# Patient Record
Sex: MALE | Race: WHITE | URBAN - METROPOLITAN AREA
[De-identification: names, ages, dates, MRNs, and addresses within clinical notes are randomized per-mention and may not be internally consistent; named-entity substitution may affect disease eponyms.]

---

## 2017-10-06 ENCOUNTER — APPOINTMENT (RX ONLY)
Dept: URBAN - METROPOLITAN AREA CLINIC 23 | Facility: CLINIC | Age: 82
Setting detail: DERMATOLOGY
End: 2017-10-06

## 2017-10-06 DIAGNOSIS — L72.0 EPIDERMAL CYST: ICD-10-CM

## 2017-10-06 DIAGNOSIS — L82.1 OTHER SEBORRHEIC KERATOSIS: ICD-10-CM

## 2017-10-06 DIAGNOSIS — L81.4 OTHER MELANIN HYPERPIGMENTATION: ICD-10-CM

## 2017-10-06 DIAGNOSIS — L21.8 OTHER SEBORRHEIC DERMATITIS: ICD-10-CM

## 2017-10-06 DIAGNOSIS — D18.0 HEMANGIOMA: ICD-10-CM

## 2017-10-06 DIAGNOSIS — L57.0 ACTINIC KERATOSIS: ICD-10-CM

## 2017-10-06 PROBLEM — I10 ESSENTIAL (PRIMARY) HYPERTENSION: Status: ACTIVE | Noted: 2017-10-06

## 2017-10-06 PROBLEM — D18.01 HEMANGIOMA OF SKIN AND SUBCUTANEOUS TISSUE: Status: ACTIVE | Noted: 2017-10-06

## 2017-10-06 PROBLEM — M12.9 ARTHROPATHY, UNSPECIFIED: Status: ACTIVE | Noted: 2017-10-06

## 2017-10-06 PROCEDURE — ? COUNSELING

## 2017-10-06 PROCEDURE — 17000 DESTRUCT PREMALG LESION: CPT

## 2017-10-06 PROCEDURE — 99202 OFFICE O/P NEW SF 15 MIN: CPT | Mod: 25

## 2017-10-06 PROCEDURE — ? LIQUID NITROGEN

## 2017-10-06 PROCEDURE — ? PRESCRIPTION

## 2017-10-06 PROCEDURE — 17003 DESTRUCT PREMALG LES 2-14: CPT

## 2017-10-06 RX ORDER — TRIAMCINOLONE ACETONIDE 1 MG/ML
LOTION TOPICAL
Qty: 1 | Refills: 2 | Status: ERX | COMMUNITY
Start: 2017-10-06

## 2017-10-06 RX ADMIN — TRIAMCINOLONE ACETONIDE: 1 LOTION TOPICAL at 13:06

## 2017-10-06 ASSESSMENT — LOCATION ZONE DERM
LOCATION ZONE: ARM
LOCATION ZONE: FACE
LOCATION ZONE: HAND
LOCATION ZONE: SCALP
LOCATION ZONE: EAR
LOCATION ZONE: NOSE
LOCATION ZONE: TRUNK

## 2017-10-06 ASSESSMENT — LOCATION SIMPLE DESCRIPTION DERM
LOCATION SIMPLE: POSTERIOR SCALP
LOCATION SIMPLE: RIGHT SHOULDER
LOCATION SIMPLE: RIGHT HAND
LOCATION SIMPLE: RIGHT UPPER BACK
LOCATION SIMPLE: LEFT FOREARM
LOCATION SIMPLE: SCALP
LOCATION SIMPLE: RIGHT CHEEK
LOCATION SIMPLE: RIGHT EYEBROW
LOCATION SIMPLE: LEFT SHOULDER
LOCATION SIMPLE: RIGHT FOREHEAD
LOCATION SIMPLE: ABDOMEN
LOCATION SIMPLE: LEFT EAR
LOCATION SIMPLE: LEFT UPPER BACK
LOCATION SIMPLE: NOSE
LOCATION SIMPLE: CHEST

## 2017-10-06 ASSESSMENT — LOCATION DETAILED DESCRIPTION DERM
LOCATION DETAILED: LEFT MID-UPPER BACK
LOCATION DETAILED: RIGHT MEDIAL MALAR CHEEK
LOCATION DETAILED: LEFT POSTERIOR SHOULDER
LOCATION DETAILED: STERNUM
LOCATION DETAILED: RIGHT MEDIAL UPPER BACK
LOCATION DETAILED: LEFT SUPERIOR PARIETAL SCALP
LOCATION DETAILED: RIGHT INFERIOR POSTAURICULAR SKIN
LOCATION DETAILED: POSTERIOR MID-PARIETAL SCALP
LOCATION DETAILED: LEFT DISTAL DORSAL FOREARM
LOCATION DETAILED: EPIGASTRIC SKIN
LOCATION DETAILED: LEFT INFERIOR POSTAURICULAR SKIN
LOCATION DETAILED: NASAL DORSUM
LOCATION DETAILED: RIGHT LATERAL EYEBROW
LOCATION DETAILED: RIGHT POSTERIOR SHOULDER
LOCATION DETAILED: RIGHT RADIAL DORSAL HAND
LOCATION DETAILED: RIGHT SUPERIOR FOREHEAD
LOCATION DETAILED: LEFT SUPERIOR HELIX

## 2022-03-19 PROBLEM — I25.119 ATHEROSCLEROTIC HEART DISEASE OF NATIVE CORONARY ARTERY WITH UNSPECIFIED ANGINA PECTORIS (HCC): Status: ACTIVE | Noted: 2021-12-10

## 2022-03-20 PROBLEM — I20.9 ANGINA PECTORIS, UNSPECIFIED (HCC): Status: ACTIVE | Noted: 2021-12-10

## 2022-06-21 ENCOUNTER — OFFICE VISIT (OUTPATIENT)
Dept: FAMILY MEDICINE CLINIC | Facility: CLINIC | Age: 87
End: 2022-06-21
Payer: MEDICARE

## 2022-06-21 VITALS
OXYGEN SATURATION: 97 % | HEART RATE: 47 BPM | SYSTOLIC BLOOD PRESSURE: 92 MMHG | BODY MASS INDEX: 31.9 KG/M2 | WEIGHT: 216 LBS | DIASTOLIC BLOOD PRESSURE: 60 MMHG | TEMPERATURE: 97.5 F

## 2022-06-21 DIAGNOSIS — E53.8 B12 DEFICIENCY: ICD-10-CM

## 2022-06-21 DIAGNOSIS — R39.15 URINARY URGENCY: ICD-10-CM

## 2022-06-21 DIAGNOSIS — R53.83 FATIGUE, UNSPECIFIED TYPE: ICD-10-CM

## 2022-06-21 DIAGNOSIS — R97.20 ELEVATED PROSTATE SPECIFIC ANTIGEN (PSA): ICD-10-CM

## 2022-06-21 DIAGNOSIS — C61 PROSTATE CANCER (HCC): ICD-10-CM

## 2022-06-21 DIAGNOSIS — I10 HYPERTENSION, UNSPECIFIED TYPE: ICD-10-CM

## 2022-06-21 DIAGNOSIS — Z80.42 FAMILY HISTORY OF PROSTATE CANCER: ICD-10-CM

## 2022-06-21 DIAGNOSIS — R35.0 URINARY FREQUENCY: ICD-10-CM

## 2022-06-21 DIAGNOSIS — N41.9 PROSTATITIS, UNSPECIFIED PROSTATITIS TYPE: ICD-10-CM

## 2022-06-21 DIAGNOSIS — R39.89 ABNORMAL PROSTATE EXAM: ICD-10-CM

## 2022-06-21 DIAGNOSIS — E78.5 HYPERLIPIDEMIA, UNSPECIFIED HYPERLIPIDEMIA TYPE: Primary | ICD-10-CM

## 2022-06-21 DIAGNOSIS — Z00.00 WELCOME TO MEDICARE PREVENTIVE VISIT: ICD-10-CM

## 2022-06-21 LAB
ALBUMIN SERPL-MCNC: 3.6 G/DL (ref 3.2–4.6)
ALBUMIN/GLOB SERPL: 1.3 {RATIO} (ref 1.2–3.5)
ALP SERPL-CCNC: 68 U/L (ref 50–136)
ALT SERPL-CCNC: 20 U/L (ref 12–65)
ANION GAP SERPL CALC-SCNC: 7 MMOL/L (ref 7–16)
APPEARANCE UR: CLEAR
AST SERPL-CCNC: 19 U/L (ref 15–37)
BACTERIA URNS QL MICRO: NEGATIVE /HPF
BASOPHILS # BLD: 0 K/UL (ref 0–0.2)
BASOPHILS NFR BLD: 1 % (ref 0–2)
BILIRUB SERPL-MCNC: 1.7 MG/DL (ref 0.2–1.1)
BILIRUB UR QL: NEGATIVE
BUN SERPL-MCNC: 26 MG/DL (ref 8–23)
CALCIUM SERPL-MCNC: 9.1 MG/DL (ref 8.3–10.4)
CASTS URNS QL MICRO: ABNORMAL /LPF
CHLORIDE SERPL-SCNC: 110 MMOL/L (ref 98–107)
CHOLEST SERPL-MCNC: 138 MG/DL
CO2 SERPL-SCNC: 27 MMOL/L (ref 21–32)
COLOR UR: ABNORMAL
CREAT SERPL-MCNC: 2.1 MG/DL (ref 0.8–1.5)
DIFFERENTIAL METHOD BLD: ABNORMAL
EOSINOPHIL # BLD: 0.2 K/UL (ref 0–0.8)
EOSINOPHIL NFR BLD: 5 % (ref 0.5–7.8)
EPI CELLS #/AREA URNS HPF: ABNORMAL /HPF
ERYTHROCYTE [DISTWIDTH] IN BLOOD BY AUTOMATED COUNT: 14.9 % (ref 11.9–14.6)
GLOBULIN SER CALC-MCNC: 2.8 G/DL (ref 2.3–3.5)
GLUCOSE SERPL-MCNC: 110 MG/DL (ref 65–100)
GLUCOSE UR STRIP.AUTO-MCNC: NEGATIVE MG/DL
HCT VFR BLD AUTO: 42.2 % (ref 41.1–50.3)
HDLC SERPL-MCNC: 40 MG/DL (ref 40–60)
HDLC SERPL: 3.5 {RATIO}
HGB BLD-MCNC: 13.7 G/DL (ref 13.6–17.2)
HGB UR QL STRIP: NEGATIVE
IMM GRANULOCYTES # BLD AUTO: 0 K/UL (ref 0–0.5)
IMM GRANULOCYTES NFR BLD AUTO: 0 % (ref 0–5)
KETONES UR QL STRIP.AUTO: NEGATIVE MG/DL
LDLC SERPL CALC-MCNC: 69.6 MG/DL
LEUKOCYTE ESTERASE UR QL STRIP.AUTO: ABNORMAL
LYMPHOCYTES # BLD: 1.3 K/UL (ref 0.5–4.6)
LYMPHOCYTES NFR BLD: 32 % (ref 13–44)
MCH RBC QN AUTO: 30 PG (ref 26.1–32.9)
MCHC RBC AUTO-ENTMCNC: 32.5 G/DL (ref 31.4–35)
MCV RBC AUTO: 92.3 FL (ref 79.6–97.8)
MONOCYTES # BLD: 0.5 K/UL (ref 0.1–1.3)
MONOCYTES NFR BLD: 11 % (ref 4–12)
NEUTS SEG # BLD: 2.2 K/UL (ref 1.7–8.2)
NEUTS SEG NFR BLD: 52 % (ref 43–78)
NITRITE UR QL STRIP.AUTO: NEGATIVE
NRBC # BLD: 0 K/UL (ref 0–0.2)
PH UR STRIP: 6 [PH] (ref 5–9)
PLATELET # BLD AUTO: 150 K/UL (ref 150–450)
PMV BLD AUTO: 13.5 FL (ref 9.4–12.3)
POTASSIUM SERPL-SCNC: 4.3 MMOL/L (ref 3.5–5.1)
PROT SERPL-MCNC: 6.4 G/DL (ref 6.3–8.2)
PROT UR STRIP-MCNC: NEGATIVE MG/DL
PSA SERPL-MCNC: 8.3 NG/ML
RBC # BLD AUTO: 4.57 M/UL (ref 4.23–5.6)
RBC #/AREA URNS HPF: ABNORMAL /HPF
SODIUM SERPL-SCNC: 144 MMOL/L (ref 138–145)
SP GR UR REFRACTOMETRY: 1.01 (ref 1–1.02)
TRIGL SERPL-MCNC: 142 MG/DL (ref 35–150)
TSH, 3RD GENERATION: 1.71 UIU/ML (ref 0.36–3.74)
UROBILINOGEN UR QL STRIP.AUTO: 0.2 EU/DL (ref 0.2–1)
VLDLC SERPL CALC-MCNC: 28.4 MG/DL (ref 6–23)
WBC # BLD AUTO: 4.3 K/UL (ref 4.3–11.1)
WBC URNS QL MICRO: ABNORMAL /HPF

## 2022-06-21 PROCEDURE — 1123F ACP DISCUSS/DSCN MKR DOCD: CPT | Performed by: FAMILY MEDICINE

## 2022-06-21 PROCEDURE — 96372 THER/PROPH/DIAG INJ SC/IM: CPT | Performed by: FAMILY MEDICINE

## 2022-06-21 PROCEDURE — 99214 OFFICE O/P EST MOD 30 MIN: CPT | Performed by: FAMILY MEDICINE

## 2022-06-21 PROCEDURE — G0439 PPPS, SUBSEQ VISIT: HCPCS | Performed by: FAMILY MEDICINE

## 2022-06-21 PROCEDURE — 1036F TOBACCO NON-USER: CPT | Performed by: FAMILY MEDICINE

## 2022-06-21 PROCEDURE — G8419 CALC BMI OUT NRM PARAM NOF/U: HCPCS | Performed by: FAMILY MEDICINE

## 2022-06-21 PROCEDURE — G8427 DOCREV CUR MEDS BY ELIG CLIN: HCPCS | Performed by: FAMILY MEDICINE

## 2022-06-21 RX ORDER — CYANOCOBALAMIN 1000 UG/ML
1000 INJECTION INTRAMUSCULAR; SUBCUTANEOUS ONCE
Status: COMPLETED | OUTPATIENT
Start: 2022-06-21 | End: 2022-06-21

## 2022-06-21 RX ORDER — METOPROLOL SUCCINATE 50 MG/1
50 TABLET, EXTENDED RELEASE ORAL DAILY
Qty: 90 TABLET | Refills: 1 | Status: SHIPPED | OUTPATIENT
Start: 2022-06-21

## 2022-06-21 RX ORDER — ATORVASTATIN CALCIUM 40 MG/1
40 TABLET, FILM COATED ORAL DAILY
Qty: 90 TABLET | Refills: 1 | Status: SHIPPED | OUTPATIENT
Start: 2022-06-21

## 2022-06-21 RX ORDER — TAMSULOSIN HYDROCHLORIDE 0.4 MG/1
0.4 CAPSULE ORAL DAILY
Qty: 90 CAPSULE | Refills: 1 | Status: SHIPPED | OUTPATIENT
Start: 2022-06-21 | End: 2022-06-21

## 2022-06-21 RX ORDER — ISOSORBIDE MONONITRATE 30 MG/1
30 TABLET, EXTENDED RELEASE ORAL DAILY
Qty: 90 TABLET | Refills: 1 | Status: SHIPPED | OUTPATIENT
Start: 2022-06-21

## 2022-06-21 RX ORDER — GABAPENTIN 600 MG/1
600 TABLET ORAL 2 TIMES DAILY
Qty: 30 TABLET | Refills: 5 | Status: SHIPPED | OUTPATIENT
Start: 2022-06-21 | End: 2022-09-19

## 2022-06-21 RX ORDER — TAMSULOSIN HYDROCHLORIDE 0.4 MG/1
0.4 CAPSULE ORAL DAILY
Qty: 90 CAPSULE | Refills: 1 | Status: SHIPPED | OUTPATIENT
Start: 2022-06-21

## 2022-06-21 RX ADMIN — CYANOCOBALAMIN 1000 MCG: 1000 INJECTION INTRAMUSCULAR; SUBCUTANEOUS at 10:27

## 2022-06-21 ASSESSMENT — PATIENT HEALTH QUESTIONNAIRE - PHQ9
2. FEELING DOWN, DEPRESSED OR HOPELESS: 0
SUM OF ALL RESPONSES TO PHQ QUESTIONS 1-9: 0
1. LITTLE INTEREST OR PLEASURE IN DOING THINGS: 0
SUM OF ALL RESPONSES TO PHQ QUESTIONS 1-9: 0
SUM OF ALL RESPONSES TO PHQ9 QUESTIONS 1 & 2: 0

## 2022-06-21 ASSESSMENT — LIFESTYLE VARIABLES
HOW OFTEN DO YOU HAVE A DRINK CONTAINING ALCOHOL: MONTHLY OR LESS
HOW MANY STANDARD DRINKS CONTAINING ALCOHOL DO YOU HAVE ON A TYPICAL DAY: 1 OR 2

## 2022-06-26 ASSESSMENT — ENCOUNTER SYMPTOMS
BACK PAIN: 0
ABDOMINAL DISTENTION: 0
GASTROINTESTINAL NEGATIVE: 1
EYE REDNESS: 0
RHINORRHEA: 0
EYES NEGATIVE: 1
SORE THROAT: 0
WHEEZING: 0
CHEST TIGHTNESS: 0
RESPIRATORY NEGATIVE: 1
SHORTNESS OF BREATH: 0
VOMITING: 0

## 2022-06-26 NOTE — PROGRESS NOTES
HISTORY OF PRESENT ILLNESS  Wan Barnhart is a 80 y.o. y.o. male    Hypertension  This is a chronic problem. The problem is unchanged. Pertinent negatives include no chest pain, palpitations or shortness of breath. Other  This is a recurrent (CKD , STABLE) problem. The problem has been unchanged. Pertinent negatives include no arthralgias, chest pain, chills, fatigue, rash, sore throat or vomiting. Coronary Artery Disease  Presents for follow-up visit. Pertinent negatives include no chest pain, chest pressure, chest tightness, dizziness, leg swelling, palpitations or shortness of breath. Risk factors include hyperlipidemia. Hyperlipidemia  This is a recurrent problem. The problem is controlled. Pertinent negatives include no chest pain or shortness of breath. Allergies   Allergen Reactions    Latex Other (See Comments) and Rash     Other reaction(s): Unknown        Current Outpatient Medications   Medication Sig    tamsulosin (FLOMAX) 0.4 MG capsule Take 1 capsule by mouth daily    gabapentin (NEURONTIN) 600 MG tablet Take 1 tablet by mouth 2 times daily for 90 days.  isosorbide mononitrate (IMDUR) 30 MG extended release tablet Take 1 tablet by mouth daily    metoprolol succinate (TOPROL XL) 50 MG extended release tablet Take 1 tablet by mouth daily    atorvastatin (LIPITOR) 40 MG tablet Take 1 tablet by mouth daily TAKE 1 TABLET BY MOUTH EVERY DAY    aspirin 81 MG EC tablet Take by mouth daily    brimonidine (ALPHAGAN) 0.2 % ophthalmic solution APPLY 1 DROP IN RIGHT EYE TWICE A DAY    cetirizine (ZYRTEC) 10 MG tablet Take 10 mg by mouth    fexofenadine (ALLEGRA) 180 MG tablet Take 180 mg by mouth daily    folic acid (FOLVITE) 938 MCG tablet Take 800 mcg by mouth daily    latanoprost (XALATAN) 0.005 % ophthalmic solution Apply 1 drop to eye 2 times daily    omeprazole (PRILOSEC OTC) 20 MG tablet Take 20 mg by mouth     No current facility-administered medications for this visit. Past Medical History:   Diagnosis Date    Acute prostatitis in 2011    Anemia 11/5/2014    Asymptomatic varicose veins     CAD (coronary artery disease) 1/28/2014    CKD (chronic kidney disease)     Dr Meraz Ok follows- Stage III, Nov 2015 creatinine 1.9    Congestive heart failure, unspecified     Constipation     Elevated prostate specific antigen (PSA)     Elevated PSA     Family history of malignant neoplasm of prostate     Brother hadtherapy brachy    GERD (gastroesophageal reflux disease)     controlled with med.  Glaucoma     managed with medication    Glucose intolerance (impaired glucose tolerance)     HLD (hyperlipidemia) 1/28/2014    Hx of echocardiogram 11/9/2015    LVEF 55-60%, biatrial enlargement, concentric hypertrophy of the left ventricle, mild MR, mild TR, aortic sclerosis without stenosis    Hyperparathyroidism, unspecified ([address]     Hypertension     controlled with med. 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 9)     BMI 34    Peripheral neuropathy     bilateral feet    Status post total right knee replacement 11/23/2015        Past Surgical History:   Procedure Laterality Date    CARDIAC CATHETERIZATION  1997    CATARACT REMOVAL Bilateral     with lens implants    CHOLECYSTECTOMY  2013    COLONOSCOPY  2014    polypectomy    HERNIA REPAIR Bilateral     inguinal hernia repair    ORTHOPEDIC SURGERY Right 2015    knee replacement    ORTHOPEDIC SURGERY      right knee open procedure     TONSILLECTOMY  childhood        Social History     Socioeconomic History    Marital status:       Spouse name: Not on file    Number of children: Not on file    Years of education: Not on file    Highest education level: Not on file   Occupational History    Not on file   Tobacco Use    Smoking status: Former Smoker Packs/day: 0.50    Smokeless tobacco: Never Used    Tobacco comment: Quit smoking: stopped in 1975   Substance and Sexual Activity    Alcohol use: Yes    Drug use: No    Sexual activity: Not on file   Other Topics Concern    Not on file   Social History Narrative    Not on file     Social Determinants of Health     Financial Resource Strain:     Difficulty of Paying Living Expenses: Not on file   Food Insecurity:     Worried About Running Out of Food in the Last Year: Not on file    Naomi of Food in the Last Year: Not on file   Transportation Needs:     Lack of Transportation (Medical): Not on file    Lack of Transportation (Non-Medical): Not on file   Physical Activity: Inactive    Days of Exercise per Week: 0 days    Minutes of Exercise per Session: 0 min   Stress:     Feeling of Stress : Not on file   Social Connections:     Frequency of Communication with Friends and Family: Not on file    Frequency of Social Gatherings with Friends and Family: Not on file    Attends Baptism Services: Not on file    Active Member of Clubs or Organizations: Not on file    Attends Club or Organization Meetings: Not on file    Marital Status: Not on file   Intimate Partner Violence:     Fear of Current or Ex-Partner: Not on file    Emotionally Abused: Not on file    Physically Abused: Not on file    Sexually Abused: Not on file   Housing Stability:     Unable to Pay for Housing in the Last Year: Not on file    Number of [place] in the Last Year: Not on file    Unstable Housing in the Last Year: Not on file        Review of Systems   Constitutional: Negative for activity change, appetite change, chills, fatigue and unexpected weight change. HENT: Negative. Negative for rhinorrhea and sore throat. Eyes: Negative. Negative for redness and visual disturbance. Respiratory: Negative. Negative for chest tightness, shortness of breath and wheezing. Cardiovascular: Negative.   Negative for chest pain, palpitations and leg swelling. Gastrointestinal: Negative. Negative for abdominal distention and vomiting. Endocrine: Negative. Genitourinary: Negative. Negative for difficulty urinating. Musculoskeletal: Negative. Negative for arthralgias and back pain. Skin: Negative. Negative for rash. Neurological: Negative. Negative for dizziness. Psychiatric/Behavioral: Negative. Negative for agitation and behavioral problems. BP 92/60 (Site: Left Upper Arm, Position: Sitting, Cuff Size: Medium Adult)   Pulse (!) 47   Temp 97.5 °F (36.4 °C) (Temporal)   Wt 216 lb (98 kg)   SpO2 97%   BMI 31.90 kg/m²      Physical Exam  Constitutional:       General: He is not in acute distress. Appearance: Normal appearance. HENT:      Head: Normocephalic. Nose: Nose normal.   Eyes:      Conjunctiva/sclera: Conjunctivae normal.   Cardiovascular:      Rate and Rhythm: Normal rate. Pulmonary:      Effort: Pulmonary effort is normal.      Breath sounds: Normal breath sounds. Abdominal:      General: Abdomen is flat. Bowel sounds are normal.      Palpations: Abdomen is soft. Musculoskeletal:         General: Normal range of motion. Cervical back: Normal range of motion. Skin:     General: Skin is warm and dry. Neurological:      General: No focal deficit present. Mental Status: He is alert.    Psychiatric:         Mood and Affect: Mood normal.         Office Visit on 12/10/2021   Component Date Value Ref Range Status    Specific Gravity, UA 12/10/2021 1.013  1.005 - 1.030 NA Final    pH, UA 12/10/2021 7.0  5.0 - 7.5 NA Final    Color, UA 12/10/2021 Yellow  Yellow Final    Clarity, UA 12/10/2021 Clear  Clear Final    Leukocyte Esterase, Urine 12/10/2021 Negative  Negative Final    Protein, UA 12/10/2021 Negative  Negative/Trace Final    Glucose, UA 12/10/2021 Negative  Negative Final    Ketones, Urine 12/10/2021 Negative  Negative Final    Blood, Urine 12/10/2021 Negative  Negative Final    Bilirubin, Urine 12/10/2021 Negative  Negative Final    Urobilinogen, Urine 12/10/2021 1.0  0.2 - 1.0 mg/dL Final    Nitrite, Urine 12/10/2021 Negative  Negative Final    Microscopic Examination 12/10/2021 Comment   Final    Microscopic not indicated and not performed.  Glucose 12/10/2021 112* 65 - 99 mg/dL Final    BUN 12/10/2021 19  10 - 36 mg/dL Final    CREATININE 12/10/2021 1.97* 0.76 - 1.27 mg/dL Final    EGFR IF NonAfrican American 12/10/2021 29* >59 mL/min/1.73 Final    GFR  12/10/2021 34* >59 mL/min/1.73 Final    Comment: **In accordance with recommendations from the NKF-ASN Task force,**    [facility] is in the process of updating its eGFR calculation to the    2021 CKD-EPI creatinine equation that estimates kidney function    without a race variable.       Bun/Cre Ratio 12/10/2021 10  10 - 24 NA Final    Sodium 12/10/2021 144  134 - 144 mmol/L Final    Potassium 12/10/2021 5.0  3.5 - 5.2 mmol/L Final    Chloride 12/10/2021 107* 96 - 106 mmol/L Final    CO2 12/10/2021 25  20 - 29 mmol/L Final    Calcium 12/10/2021 9.2  8.6 - 10.2 mg/dL Final    Total Protein 12/10/2021 6.3  6.0 - 8.5 g/dL Final    Albumin 12/10/2021 4.0  3.5 - 4.6 g/dL Final    Globulin, Total 12/10/2021 2.3  1.5 - 4.5 g/dL Final    Albumin/Globulin Ratio 12/10/2021 1.7  1.2 - 2.2 NA Final    Total Bilirubin 12/10/2021 1.1  0.0 - 1.2 mg/dL Final    Alkaline Phosphatase 12/10/2021 82  44 - 121 IU/L Final                  **Please note reference interval change**    AST 12/10/2021 17  0 - 40 IU/L Final    ALT 12/10/2021 13  0 - 44 IU/L Final    Cholesterol, Total 12/10/2021 163  100 - 199 mg/dL Final    Triglycerides 12/10/2021 134  0 - 149 mg/dL Final    HDL 12/10/2021 40  >39 mg/dL Final    VLDL 12/10/2021 24  5 - 40 mg/dL Final    LDL Calculated 12/10/2021 99  0 - 99 mg/dL Final    LDl/HDL Ratio 12/10/2021 2.5  0.0 - 3.6 ratio Final    Comment: LDL/HDL Ratio                                              Men  Women                                1/2 Avg. Risk  1.0    1.5                                    Avg.Risk  3.6    3.2                                 2X Avg. Risk  6.2    5.0                                 3X Avg. Risk  8.0    6.1      PSA 12/10/2021 8.1* 0.0 - 4.0 ng/mL Final    Comment: Roche ECLIA methodology. According to the American Urological Association, Serum PSA should  decrease and remain at undetectable levels after radical  prostatectomy. The AUA defines biochemical recurrence as an initial  PSA value 0.2 ng/mL or greater followed by a subsequent confirmatory  PSA value 0.2 ng/mL or greater. Values obtained with different assay methods or kits cannot be used  interchangeably. Results cannot be interpreted as absolute evidence  of the presence or absence of malignant disease. ASSESSMENT and PLAN    Hyperlipidemia, unspecified hyperlipidemia type  -     Lipid Panel; Future  -     Urinalysis; Future  Elevated prostate specific antigen (PSA)  Hypertension, unspecified type  -     CBC with Auto Differential; Future  -     Comprehensive Metabolic Panel; Future  Fatigue, unspecified type  -     TSH; Future  Urinary frequency  -     Urinalysis; Future  Urinary urgency  Prostatitis, unspecified prostatitis type  Family history of prostate cancer  -     PSA Screening; Future  Abnormal prostate exam  Prostate cancer ([facility] [address]  B12 deficiency  -     cyanocobalamin injection 1,000 mcg; 1,000 mcg, IntraMUSCular, ONCE, 1 dose, On Tue 6/21/22 at 1045  Welcome to Medicare preventive visit      Current treatment plan is effective. no changes in therapy  lab results and schedule for future lab studies reviewed with patient  reviewed diet, exercise and weight control   Cardiovascular risk and specific lipid/ LDL goals reviewed    Return for Medicare Annual Wellness Visit in 1 year.      [address] Annual Wellness Visit    Cortes Tomas Interventions:  · Inadequate physical activity:  patient agrees to exercise for at least 150 minutes/week             Objective   Vitals:    06/21/22 0919   BP: 92/60   Site: Left Upper Arm   Position: Sitting   Cuff Size: Medium Adult   Pulse: (!) 47   Temp: 97.5 °F (36.4 °C)   TempSrc: Temporal   SpO2: 97%   Weight: 216 lb (98 kg)      Body mass index is 31.9 kg/m². General Appearance: alert and oriented to person, place and time, well developed and well- nourished, in no acute distress  Skin: warm and dry, no rash or erythema  Head: normocephalic and atraumatic  Eyes: pupils equal, round, and reactive to light, extraocular eye movements intact, conjunctivae normal  ENT: tympanic membrane, external ear and ear canal normal bilaterally, nose without deformity, nasal mucosa and turbinates normal without polyps  Neck: supple and non-tender without mass, no thyromegaly or thyroid nodules, no cervical lymphadenopathy  Pulmonary/Chest: clear to auscultation bilaterally- no wheezes, rales or rhonchi, normal air movement, no respiratory distress  Cardiovascular: normal rate, regular rhythm, normal S1 and S2, no murmurs, rubs, clicks, or gallops, distal pulses intact, no carotid bruits  Abdomen: soft, non-tender, non-distended, normal bowel sounds, no masses or organomegaly  Genitourinary/Rectal: genitals normal without hernia or inguinal adenopathy, rectal normal without masses, prostate normal in size without masses or nodules  Extremities: no cyanosis, clubbing or edema  Musculoskeletal: normal range of motion, no joint swelling, deformity or tenderness  Neurologic: reflexes normal and symmetric, no cranial nerve deficit, gait, coordination and speech normal       Allergies   Allergen Reactions    Latex Other (See Comments) and Rash     Other reaction(s): Unknown     Prior to Visit Medications    Medication Sig Taking?  Authorizing Provider   tamsulosin (FLOMAX) 0.4 MG capsule Take 1 capsule by mouth daily Yes Quincy Lucas MD   gabapentin (NEURONTIN) 600 MG tablet Take 1 tablet by mouth 2 times daily for 90 days.  Yes Quincy Lucas MD   isosorbide mononitrate (IMDUR) 30 MG extended release tablet Take 1 tablet by mouth daily Yes Quincy Lucas MD   metoprolol succinate (TOPROL XL) 50 MG extended release tablet Take 1 tablet by mouth daily Yes Quincy Lucas MD   atorvastatin (LIPITOR) 40 MG tablet Take 1 tablet by mouth daily TAKE 1 TABLET BY MOUTH EVERY DAY Yes Quincy Lucas MD   aspirin 81 MG EC tablet Take by mouth daily Yes Ar Automatic Reconciliation   brimonidine (ALPHAGAN) 0.2 % ophthalmic solution APPLY 1 DROP IN RIGHT EYE TWICE A DAY Yes Ar Automatic Reconciliation   cetirizine (ZYRTEC) 10 MG tablet Take 10 mg by mouth Yes Ar Automatic Reconciliation   fexofenadine (ALLEGRA) 180 MG tablet Take 180 mg by mouth daily Yes Ar Automatic Reconciliation   folic acid (FOLVITE) 086 MCG tablet Take 800 mcg by mouth daily Yes Ar Automatic Reconciliation   latanoprost (XALATAN) 0.005 % ophthalmic solution Apply 1 drop to eye 2 times daily Yes Ar Automatic Reconciliation   omeprazole (PRILOSEC OTC) 20 MG tablet Take 20 mg by mouth Yes Ar Automatic Reconciliation       CareTeam (Including outside providers/suppliers regularly involved in providing care):   Patient Care Team:  Quincy Lucas MD as PCP - Teetee Mendez MD as PCP - [facility] Empaneled Provider  Andrea Gillis MD as Physician     Reviewed and updated this visit:  Tobacco  Meds  Med Hx  Surg Hx  Soc Hx  Fam Hx

## 2022-06-26 NOTE — PATIENT INSTRUCTIONS
Personalized Preventive Plan for Salvador Loya - 6/21/2022  Medicare offers a range of preventive health benefits. Some of the tests and screenings are paid in full while other may be subject to a deductible, co-insurance, and/or copay. Some of these benefits include a comprehensive review of your medical history including lifestyle, illnesses that may run in your family, and various assessments and screenings as appropriate. After reviewing your medical record and screening and assessments performed today your provider may have ordered immunizations, labs, imaging, and/or referrals for you. A list of these orders (if applicable) as well as your Preventive Care list are included within your After Visit Summary for your review. Other Preventive Recommendations:    · A preventive eye exam performed by an eye specialist is recommended every 1-2 years to screen for glaucoma; cataracts, macular degeneration, and other eye disorders. · A preventive dental visit is recommended every 6 months. · Try to get at least 150 minutes of exercise per week or 10,000 steps per day on a pedometer . · Order or download the FREE \"Exercise & Physical Activity: Your Everyday Guide\" from [employer]. Call 9-359.133.4302 or search [employer] online. · You need 5439-8056 mg of calcium and 8998-2725 IU of vitamin D per day. It is possible to meet your calcium requirement with diet alone, but a vitamin D supplement is usually necessary to meet this goal.  · When exposed to the sun, use a sunscreen that protects against both UVA and UVB radiation with an SPF of 30 or greater. Reapply every 2 to 3 hours or after sweating, drying off with a towel, or swimming. · Always wear a seat belt when traveling in a car. Always wear a helmet when riding a bicycle or motorcycle.

## 2022-06-29 ENCOUNTER — NURSE TRIAGE (OUTPATIENT)
Dept: OTHER | Facility: CLINIC | Age: 87
End: 2022-06-29

## 2022-06-29 NOTE — TELEPHONE ENCOUNTER
Received call from [place] at [facility] with [facility]. Subjective: Caller states \"he has rectal pain. Bumpy, itchy and sore. \"     Current Symptoms: rectal pain, itchy 5/10, constipation    Onset: 1 day ago; sudden, rapid, unchanged    Associated Symptoms: NA    Pain Severity: 6/10; feels like his rectum has bumps, pain and tender itchy; constant, moderate    Temperature: no n/aq    What has been tried: Nothing    LMP: NA Pregnant: NA    Recommended disposition: See in Office Today    Care advice provided, patient verbalizes understanding; denies any other questions or concerns; instructed to call back for any new or worsening symptoms. Patient/Caller agrees with recommended disposition; writer provided warm transfer to [place] at [facility] for appointment scheduling     Attention Provider: Thank you for allowing me to participate in the care of your patient. The patient was connected to triage in response to information provided to the ECC/PSC. Please do not respond through this encounter as the response is not directed to a shared pool.             Reason for Disposition   MODERATE-SEVERE rectal pain (i.e., interferes with school, work, or sleep)    Protocols used: RECTAL SYMPTOMS-ADULT-[place]

## 2022-07-08 DIAGNOSIS — E78.2 MIXED HYPERLIPIDEMIA: ICD-10-CM

## 2022-07-14 RX ORDER — ATORVASTATIN CALCIUM 40 MG/1
TABLET, FILM COATED ORAL
Qty: 90 TABLET | Refills: 1 | OUTPATIENT
Start: 2022-07-14

## 2022-08-09 DIAGNOSIS — I10 ESSENTIAL (PRIMARY) HYPERTENSION: ICD-10-CM

## 2022-08-09 RX ORDER — METOPROLOL SUCCINATE 50 MG/1
TABLET, EXTENDED RELEASE ORAL
Qty: 90 TABLET | Refills: 1 | OUTPATIENT
Start: 2022-08-09

## 2022-10-10 ENCOUNTER — OFFICE VISIT (OUTPATIENT)
Dept: ENT CLINIC | Age: 87
End: 2022-10-10
Payer: MEDICARE

## 2022-10-10 VITALS
SYSTOLIC BLOOD PRESSURE: 110 MMHG | DIASTOLIC BLOOD PRESSURE: 70 MMHG | WEIGHT: 214 LBS | BODY MASS INDEX: 30.64 KG/M2 | HEIGHT: 70 IN

## 2022-10-10 DIAGNOSIS — H61.23 IMPACTED CERUMEN, BILATERAL: Primary | ICD-10-CM

## 2022-10-10 DIAGNOSIS — H90.3 SENSORINEURAL HEARING LOSS, BILATERAL: ICD-10-CM

## 2022-10-10 PROCEDURE — 69210 REMOVE IMPACTED EAR WAX UNI: CPT | Performed by: STUDENT IN AN ORGANIZED HEALTH CARE EDUCATION/TRAINING PROGRAM

## 2022-10-10 ASSESSMENT — ENCOUNTER SYMPTOMS
EYE DISCHARGE: 0
ABDOMINAL PAIN: 0
COUGH: 0

## 2022-10-10 NOTE — PROGRESS NOTES
[facility] ENT Office Note    Patient: Mykel Denny  MRN: [id number]  : 1931  Gender:  male  Vital Signs: /70 (Site: Left Upper Arm, Position: Sitting)   Ht 5' 10\" (1.778 m)   Wt 214 lb (97.1 kg)   BMI 30.71 kg/m²   Date: 10/10/2022    CC:   Chief Complaint   Patient presents with    Cerumen Impaction     Patient here for wax removal.       HPI:  Mykel Denny is a 80 y.o. male with bilateral sensorineural hearing loss. He wears hearing aids. He is here for cerumen debridement. Past Medical History, Past Surgical History, Family history, Social History, and Medications were all reviewed with the patient today and updated as necessary.      Allergies   Allergen Reactions    Latex Other (See Comments) and Rash     Other reaction(s): Unknown     Patient Active Problem List   Diagnosis    CAD (coronary artery disease)    Mitral valve insufficiency    Esophageal reflux    Glaucoma    HLD (hyperlipidemia)    Chronic kidney disease, stage I    BPH (benign prostatic hyperplasia)    Pure hypercholesterolemia    Atherosclerotic heart disease of native coronary artery with unspecified angina pectoris (HCC)    Elevated prostate specific antigen (PSA)    Impotence of organic origin    Status post total right knee replacement    Anemia    Acute prostatitis    Angina pectoris, unspecified ([facility] [address]    Family history of malignant neoplasm of prostate    Neuropathy    Hypertension     Current Outpatient Medications   Medication Sig    tamsulosin (FLOMAX) 0.4 MG capsule Take 1 capsule by mouth daily    isosorbide mononitrate (IMDUR) 30 MG extended release tablet Take 1 tablet by mouth daily    metoprolol succinate (TOPROL XL) 50 MG extended release tablet Take 1 tablet by mouth daily    atorvastatin (LIPITOR) 40 MG tablet Take 1 tablet by mouth daily TAKE 1 TABLET BY MOUTH EVERY DAY    aspirin 81 MG EC tablet Take by mouth daily    brimonidine (ALPHAGAN) 0.2 % ophthalmic solution APPLY 1 DROP IN RIGHT EYE TWICE A DAY    cetirizine (ZYRTEC) 10 MG tablet Take 10 mg by mouth    fexofenadine (ALLEGRA) 180 MG tablet Take 180 mg by mouth daily    folic acid (FOLVITE) 816 MCG tablet Take 800 mcg by mouth daily    latanoprost (XALATAN) 0.005 % ophthalmic solution Apply 1 drop to eye 2 times daily    omeprazole (PRILOSEC OTC) 20 MG tablet Take 20 mg by mouth    gabapentin (NEURONTIN) 600 MG tablet Take 1 tablet by mouth 2 times daily for 90 days. No current facility-administered medications for this visit. Past Medical History:   Diagnosis Date    Acute prostatitis in 2011    Anemia 11/5/2014    Asymptomatic varicose veins     CAD (coronary artery disease) 1/28/2014    CKD (chronic kidney disease)     Dr Stuart Duenas follows- Stage III, Nov 2015 creatinine 1.9    Congestive heart failure, unspecified     Constipation     Elevated prostate specific antigen (PSA)     Elevated PSA     Family history of malignant neoplasm of prostate     Brother hadtherapy brachy    GERD (gastroesophageal reflux disease)     controlled with med. Glaucoma     managed with medication    Glucose intolerance (impaired glucose tolerance)     HLD (hyperlipidemia) 1/28/2014    Hx of echocardiogram 11/9/2015    LVEF 55-60%, biatrial enlargement, concentric hypertrophy of the left ventricle, mild MR, mild TR, aortic sclerosis without stenosis    Hyperparathyroidism, unspecified ([address]     Hypertension     controlled with med. 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 9)     BMI 34    Peripheral neuropathy     bilateral feet    Status post total right knee replacement 11/23/2015     Social History     Tobacco Use    Smoking status: Former     Packs/day: 0.50     Types: Cigarettes    Smokeless tobacco: Never    Tobacco comments:     Quit smoking: stopped in 1975   Substance Use Topics    Alcohol use:  Yes Past Surgical History:   Procedure Laterality Date    CARDIAC CATHETERIZATION  1997    CATARACT REMOVAL Bilateral     with lens implants    CHOLECYSTECTOMY  2013    COLONOSCOPY  2014    polypectomy    HERNIA REPAIR Bilateral     inguinal hernia repair    ORTHOPEDIC SURGERY Right 2015    knee replacement    ORTHOPEDIC SURGERY      right knee open procedure     TONSILLECTOMY  childhood     Family History   Problem Relation Age of Onset    Heart Disease Father     Heart Disease Mother         ROS:    Review of Systems   Constitutional:  Negative for fever. HENT:  Negative for ear discharge and ear pain. Eyes:  Negative for discharge. Respiratory:  Negative for cough. Cardiovascular:  Negative for chest pain. Gastrointestinal:  Negative for abdominal pain. Musculoskeletal:  Negative for arthralgias and neck pain. Skin:  Negative for rash. Allergic/Immunologic: Negative for environmental allergies. Neurological:  Negative for dizziness. Hematological:  Negative for adenopathy. Psychiatric/Behavioral:  Negative for agitation. PHYSICAL EXAM:    /70 (Site: Left Upper Arm, Position: Sitting)   Ht 5' 10\" (1.778 m)   Wt 214 lb (97.1 kg)   BMI 30.71 kg/m²     General: NAD, well-appearing  Neuro: No gross neuro deficits. CN's II-XII intact. No facial weakness. Eyes: EOMI. Pupils reactive. No periorbital edema/ecchymosis. Skin: No facial erythema, rashes or concerning lesions. Nose: No external deviations or saddling. Intranasally, septum is midline without perforations, nasal mucosa appears healthy with no erythema, mucopurulence, or polyps. Mouth: Moist mucus membranes, normal tongue/palate mobility, no concerning mucosal lesions. Oropharynx: clear with no erythema/exudate, no tonsillar hypertrophy. Ears: Normal appearing auricles, no hematomas. EACs with bilateral cerumen impaction, healthy canal skin, TM's intact with no perforations or retraction pockets.  No middle ear effusions. Neck: Soft, supple, no palpable lateral neck masses. No parotid or submandibular masses. No thyromegaly or palpable thyroid nodules. No surgical scars. Lymphatics: No palpable cervical LAD. Resp/Lungs: No audible stridor or wheezing, CTAB  Heart: RRR  Extremities: No clubbing or cyanosis. PROCEDURE: Cerumen removal under binocular microscopy  INDICATIONS: Cerumen impaction  DESCRIPTION: After verbal consent was obtained and a timeout was performed, the otologic microscope was used to visualize both ears. There were normal appearing auricles bilaterally. There was cerumen impaction bilaterally. I cleaned out both ears under the scope w/ a right angle hook and otologic suctions. After cleaning, the ear canal skin was healthy and both TMs were intact w/ no perforations. Both middle ear spaces were clear w/ no effusions. The patient tolerated the procedure well and there were no complications. ASSESSMENT and PLAN  I will see him back in 6 months for another cleaning. ICD-10-CM    1. Impacted cerumen, bilateral  H61.23 REMOVE IMPACTED EAR WAX      2. Sensorineural hearing loss, bilateral  H90.3             Zacarias Flatten, [address] [place]  10/10/2022  Electronically signed    Note dictated using voice recognition software. Please excuse any typos.

## 2022-10-28 ENCOUNTER — OFFICE VISIT (OUTPATIENT)
Dept: FAMILY MEDICINE CLINIC | Facility: CLINIC | Age: 87
End: 2022-10-28
Payer: MEDICARE

## 2022-10-28 VITALS
DIASTOLIC BLOOD PRESSURE: 72 MMHG | SYSTOLIC BLOOD PRESSURE: 116 MMHG | BODY MASS INDEX: 30.06 KG/M2 | OXYGEN SATURATION: 95 % | WEIGHT: 210 LBS | HEART RATE: 85 BPM | HEIGHT: 70 IN | TEMPERATURE: 97.2 F

## 2022-10-28 DIAGNOSIS — I50.9 CONGESTIVE HEART FAILURE, UNSPECIFIED HF CHRONICITY, UNSPECIFIED HEART FAILURE TYPE (HCC): Primary | ICD-10-CM

## 2022-10-28 DIAGNOSIS — I10 ESSENTIAL (PRIMARY) HYPERTENSION: ICD-10-CM

## 2022-10-28 DIAGNOSIS — N18.4 CKD (CHRONIC KIDNEY DISEASE) STAGE 4, GFR 15-29 ML/MIN (HCC): ICD-10-CM

## 2022-10-28 PROBLEM — I20.9 ANGINA PECTORIS, UNSPECIFIED (HCC): Status: RESOLVED | Noted: 2021-12-10 | Resolved: 2022-10-28

## 2022-10-28 PROBLEM — I25.119 ATHEROSCLEROTIC HEART DISEASE OF NATIVE CORONARY ARTERY WITH UNSPECIFIED ANGINA PECTORIS (HCC): Status: RESOLVED | Noted: 2021-12-10 | Resolved: 2022-10-28

## 2022-10-28 LAB
ALBUMIN SERPL-MCNC: 3.9 G/DL (ref 3.2–4.6)
ALBUMIN/GLOB SERPL: 1.4 {RATIO} (ref 0.4–1.6)
ALP SERPL-CCNC: 72 U/L (ref 50–136)
ALT SERPL-CCNC: 17 U/L (ref 12–65)
ANION GAP SERPL CALC-SCNC: 5 MMOL/L (ref 2–11)
AST SERPL-CCNC: 16 U/L (ref 15–37)
BILIRUB SERPL-MCNC: 1.4 MG/DL (ref 0.2–1.1)
BUN SERPL-MCNC: 38 MG/DL (ref 8–23)
CALCIUM SERPL-MCNC: 9.3 MG/DL (ref 8.3–10.4)
CHLORIDE SERPL-SCNC: 105 MMOL/L (ref 101–110)
CO2 SERPL-SCNC: 30 MMOL/L (ref 21–32)
CREAT SERPL-MCNC: 2.3 MG/DL (ref 0.8–1.5)
GLOBULIN SER CALC-MCNC: 2.7 G/DL (ref 2.8–4.5)
GLUCOSE SERPL-MCNC: 89 MG/DL (ref 65–100)
POTASSIUM SERPL-SCNC: 4.1 MMOL/L (ref 3.5–5.1)
PROT SERPL-MCNC: 6.6 G/DL (ref 6.3–8.2)
SODIUM SERPL-SCNC: 140 MMOL/L (ref 133–143)

## 2022-10-28 PROCEDURE — 99214 OFFICE O/P EST MOD 30 MIN: CPT | Performed by: FAMILY MEDICINE

## 2022-10-28 RX ORDER — FUROSEMIDE 40 MG/1
TABLET ORAL
COMMUNITY
Start: 2022-10-25

## 2022-10-30 PROBLEM — N18.4 CKD (CHRONIC KIDNEY DISEASE) STAGE 4, GFR 15-29 ML/MIN (HCC): Status: ACTIVE | Noted: 2022-10-30

## 2022-10-30 ASSESSMENT — ENCOUNTER SYMPTOMS
EYES NEGATIVE: 1
GASTROINTESTINAL NEGATIVE: 1
SHORTNESS OF BREATH: 0
RESPIRATORY NEGATIVE: 1

## 2022-10-31 NOTE — PROGRESS NOTES
HISTORY OF PRESENT ILLNESS  Tracey Barillas is a 80 y.o. y.o. male    Congestive Heart Failure  Presents for follow-up (admitted 10/24 symptomatic CHF , diuresis of 13 # , symptoms resolved her for follow up , still on lasix) visit. Pertinent negatives include no chest pain, chest pressure, palpitations, paroxysmal nocturnal dyspnea or shortness of breath. The symptoms have been resolved. Compliance problems include adherence to diet. Compliance with diet is %. Compliance with exercise is %. Compliance with medications is %. Allergies   Allergen Reactions    Latex Other (See Comments) and Rash     Other reaction(s): Unknown        Current Outpatient Medications   Medication Sig    tamsulosin (FLOMAX) 0.4 MG capsule Take 1 capsule by mouth daily    isosorbide mononitrate (IMDUR) 30 MG extended release tablet Take 1 tablet by mouth daily    metoprolol succinate (TOPROL XL) 50 MG extended release tablet Take 1 tablet by mouth daily    atorvastatin (LIPITOR) 40 MG tablet Take 1 tablet by mouth daily TAKE 1 TABLET BY MOUTH EVERY DAY    aspirin 81 MG EC tablet Take by mouth daily    brimonidine (ALPHAGAN) 0.2 % ophthalmic solution APPLY 1 DROP IN RIGHT EYE TWICE A DAY    cetirizine (ZYRTEC) 10 MG tablet Take 10 mg by mouth    fexofenadine (ALLEGRA) 180 MG tablet Take 180 mg by mouth daily    folic acid (FOLVITE) 158 MCG tablet Take 800 mcg by mouth daily    latanoprost (XALATAN) 0.005 % ophthalmic solution Apply 1 drop to eye 2 times daily    omeprazole (PRILOSEC OTC) 20 MG tablet Take 20 mg by mouth    furosemide (LASIX) 40 MG tablet     gabapentin (NEURONTIN) 600 MG tablet Take 1 tablet by mouth 2 times daily for 90 days. No current facility-administered medications for this visit.         Past Medical History:   Diagnosis Date    Acute prostatitis in 2011    Anemia 11/5/2014    Asymptomatic varicose veins     CAD (coronary artery disease) 1/28/2014    CKD (chronic kidney disease)      Walker follows- Stage III, Nov 2015 creatinine 1.9    Congestive heart failure, unspecified     Constipation     Elevated prostate specific antigen (PSA)     Elevated PSA     Family history of malignant neoplasm of prostate     Brother hadtherapy brachy    GERD (gastroesophageal reflux disease)     controlled with med. Glaucoma     managed with medication    Glucose intolerance (impaired glucose tolerance)     HLD (hyperlipidemia) 1/28/2014    Hx of echocardiogram 11/9/2015    LVEF 55-60%, biatrial enlargement, concentric hypertrophy of the left ventricle, mild MR, mild TR, aortic sclerosis without stenosis    Hyperparathyroidism, unspecified ([address]     Hypertension     controlled with med. 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 9)     BMI 34    Peripheral neuropathy     bilateral feet    Status post total right knee replacement 11/23/2015        Past Surgical History:   Procedure Laterality Date    CARDIAC CATHETERIZATION  1997    CATARACT REMOVAL Bilateral     with lens implants    CHOLECYSTECTOMY  2013    COLONOSCOPY  2014    polypectomy    HERNIA REPAIR Bilateral     inguinal hernia repair    ORTHOPEDIC SURGERY Right 2015    knee replacement    ORTHOPEDIC SURGERY      right knee open procedure     TONSILLECTOMY  childhood        Social History     Socioeconomic History    Marital status:       Spouse name: Not on file    Number of children: Not on file    Years of education: Not on file    Highest education level: Not on file   Occupational History    Not on file   Tobacco Use    Smoking status: Former     Packs/day: 0.50     Types: Cigarettes    Smokeless tobacco: Never    Tobacco comments:     Quit smoking: stopped in 1975   Substance and Sexual Activity    Alcohol use: Yes    Drug use: No    Sexual activity: Not on file   Other Topics Concern    Not on file   Social History Narrative    Not on file     Social Determinants of Health     Financial Resource Strain: Not on file   Food Insecurity: Not on file   Transportation Needs: Not on file   Physical Activity: Inactive    Days of Exercise per Week: 0 days    Minutes of Exercise per Session: 0 min   Stress: Not on file   Social Connections: Not on file   Intimate Partner Violence: Not on file   Housing Stability: Not on file        Review of Systems   Constitutional: Negative. HENT: Negative. Eyes: Negative. Respiratory: Negative. Negative for shortness of breath. Cardiovascular: Negative. Negative for chest pain and palpitations. Gastrointestinal: Negative. Endocrine: Negative. Genitourinary: Negative. Skin: Negative. Neurological: Negative. Psychiatric/Behavioral: Negative. /72   Pulse 85   Temp 97.2 °F (36.2 °C) (Temporal)   Ht 5' 10\" (1.778 m)   Wt 210 lb (95.3 kg)   SpO2 95%   BMI 30.13 kg/m²      Physical Exam  Constitutional:       General: He is not in acute distress. Appearance: Normal appearance. HENT:      Head: Normocephalic. Nose: Nose normal.   Eyes:      Conjunctiva/sclera: Conjunctivae normal.   Cardiovascular:      Rate and Rhythm: Normal rate. Pulmonary:      Effort: Pulmonary effort is normal.      Breath sounds: Normal breath sounds. Abdominal:      General: Abdomen is flat. Bowel sounds are normal.      Palpations: Abdomen is soft. Musculoskeletal:         General: Normal range of motion. Cervical back: Normal range of motion. Skin:     General: Skin is warm and dry. Neurological:      General: No focal deficit present. Mental Status: He is alert.    Psychiatric:         Mood and Affect: Mood normal.       Office Visit on 06/21/2022   Component Date Value Ref Range Status    Color, UA 06/21/2022 YELLOW/STRAW    Final    Color Reference Range: Straw, Yellow or Dark Yellow    Appearance 06/21/2022 CLEAR    Final Specific Gravity, UA 06/21/2022 1.009  1.001 - 1.023   Final    pH, Urine 06/21/2022 6.0  5.0 - 9.0   Final    Protein, UA 06/21/2022 Negative  Negative mg/dL Final    Glucose, UA 06/21/2022 Negative  mg/dL Final    Ketones, Urine 06/21/2022 Negative  Negative mg/dL Final    Bilirubin Urine 06/21/2022 Negative  Negative   Final    Blood, Urine 06/21/2022 Negative  Negative   Final    Urobilinogen, Urine 06/21/2022 0.2  0.2 - 1.0 EU/dL Final    Nitrite, Urine 06/21/2022 Negative  Negative   Final    Leukocyte Esterase, Urine 06/21/2022 TRACE (A)  Negative   Final    WBC, UA 06/21/2022 0-4 (A)  NORMAL /hpf Final    RBC, UA 06/21/2022 0-5 (A)  NORMAL /hpf Final    Epithelial Cells UA 06/21/2022 0-5 (A)  NORMAL /hpf Final    BACTERIA, URINE 06/21/2022 Negative (A)  NORMAL /hpf Final    Casts 06/21/2022 0-2 (A)  NORMAL /lpf Final    TSH, 3RD GENERATION 06/21/2022 1.710  0.358 - 3.740 uIU/mL Final    PSA 06/21/2022 8.3 (A)  <4.0 ng/mL Final    Comment: [employer].   New method in use, please reestablish patient baseline      Cholesterol, Total 06/21/2022 138  <200 MG/DL Final    Comment: Borderline High: 200-239 mg/dL  High: Greater than or equal to 240 mg/dL      Triglycerides 06/21/2022 142  35 - 150 MG/DL Final    Comment: Borderline High: 150-199 mg/dL, High: 200-499 mg/dL  Very High: Greater than or equal to 500 mg/dL      HDL 06/21/2022 40  40 - 60 MG/DL Final    LDL Calculated 06/21/2022 69.6  <100 MG/DL Final    Comment: Near Optimal: 100-129 mg/dL  Borderline High: 130-159, High: 160-189 mg/dL  Very High: Greater than or equal to 190 mg/dL      VLDL Cholesterol Calculated 06/21/2022 28.4 (A)  6.0 - 23.0 MG/DL Final    Chol/HDL Ratio 06/21/2022 3.5    Final    Sodium 06/21/2022 144  138 - 145 mmol/L Final    Potassium 06/21/2022 4.3  3.5 - 5.1 mmol/L Final    Chloride 06/21/2022 110 (A)  98 - 107 mmol/L Final    CO2 06/21/2022 27  21 - 32 mmol/L Final    Anion Gap 06/21/2022 7  7 - 16 mmol/L Final Glucose 06/21/2022 110 (A)  65 - 100 mg/dL Final    BUN 06/21/2022 26 (A)  8 - 23 MG/DL Final    Creatinine 06/21/2022 2.10 (A)  0.8 - 1.5 MG/DL Final    GFR  06/21/2022 38 (A)  >60 ml/min/1.73m2 Final    GFR Non- 06/21/2022 32 (A)  >60 ml/min/1.73m2 Final    Comment:   Estimated GFR is calculated using the Modification of Diet in Renal Disease (MDRD) Study equation, reported for both  Americans (GFRAA) and non- Americans (GFRNA), and normalized to 1.73m2 body surface area. The physician must decide which value applies to the patient. The MDRD study equation should only be used in individuals age 25 or older. It has not been validated for the following: pregnant women, patients with serious comorbid conditions,or on certain medications, or persons with extremes of body size, muscle mass, or nutritional status.       Calcium 06/21/2022 9.1  8.3 - 10.4 MG/DL Final    Total Bilirubin 06/21/2022 1.7 (A)  0.2 - 1.1 MG/DL Final    ALT 06/21/2022 20  12 - 65 U/L Final    AST 06/21/2022 19  15 - 37 U/L Final    Alk Phosphatase 06/21/2022 68  50 - 136 U/L Final    Total Protein 06/21/2022 6.4  6.3 - 8.2 g/dL Final    Albumin 06/21/2022 3.6  3.2 - 4.6 g/dL Final    Globulin 06/21/2022 2.8  2.3 - 3.5 g/dL Final    Albumin/Globulin Ratio 06/21/2022 1.3  1.2 - 3.5   Final    WBC 06/21/2022 4.3  4.3 - 11.1 K/uL Final    RBC 06/21/2022 4.57  4.23 - 5.6 M/uL Final    Hemoglobin 06/21/2022 13.7  13.6 - 17.2 g/dL Final    Hematocrit 06/21/2022 42.2  41.1 - 50.3 % Final    MCV 06/21/2022 92.3  79.6 - 97.8 FL Final    MCH 06/21/2022 30.0  26.1 - 32.9 PG Final    MCHC 06/21/2022 32.5  31.4 - 35.0 g/dL Final    RDW 06/21/2022 14.9 (A)  11.9 - 14.6 % Final    Platelets 59/68/1312 150  150 - 450 K/uL Final    MPV 06/21/2022 13.5 (A)  9.4 - 12.3 FL Final    nRBC 06/21/2022 0.00  0.0 - 0.2 K/uL Final    **Note: Absolute NRBC parameter is now reported with Hemogram**    Differential Type 06/21/2022 AUTOMATED    Final    Seg Neutrophils 06/21/2022 52  43 - 78 % Final    Lymphocytes 06/21/2022 32  13 - 44 % Final    Monocytes 06/21/2022 11  4.0 - 12.0 % Final    Eosinophils % 06/21/2022 5  0.5 - 7.8 % Final    Basophils 06/21/2022 1  0.0 - 2.0 % Final    Immature Granulocytes 06/21/2022 0  0.0 - 5.0 % Final    Segs Absolute 06/21/2022 2.2  1.7 - 8.2 K/UL Final    Absolute Lymph # 06/21/2022 1.3  0.5 - 4.6 K/UL Final    Absolute Mono # 06/21/2022 0.5  0.1 - 1.3 K/UL Final    Absolute Eos # 06/21/2022 0.2  0.0 - 0.8 K/UL Final    Basophils Absolute 06/21/2022 0.0  0.0 - 0.2 K/UL Final    Absolute Immature Granulocyte 06/21/2022 0.0  0.0 - 0.5 K/UL Final       ASSESSMENT and PLAN    Congestive heart failure, unspecified HF chronicity, unspecified heart failure type ([facility])  -     Comprehensive Metabolic Panel; Future  Essential (primary) hypertension  CKD (chronic kidney disease) stage 4, GFR 15-29 ml/min ([facility])      Current treatment plan is effective. no changes in therapy  lab results and schedule for future lab studies reviewed with patient  reviewed diet, exercise and weight control     No follow-ups on file.      Oscar Baker MD

## 2022-11-04 RX ORDER — FUROSEMIDE 20 MG/1
20 TABLET ORAL DAILY
Qty: 90 TABLET | Refills: 3 | Status: SHIPPED | OUTPATIENT
Start: 2022-11-04

## 2022-11-04 NOTE — TELEPHONE ENCOUNTER
Patient needs to know if he needs to stay on Lasix. There are no refills on the Rx he was given on discharge from hospital. Patient uses [facility] on [address] [id number] and [address] [facility] in [facility]. Please call patient to let him know. He does not have MyChart.

## 2022-11-04 NOTE — TELEPHONE ENCOUNTER
CALLED BACK REGARDING PRIOR  MESSAGE AND AS PER DR BROWN CONTINUE TAKING LASIX DECREASED TO 20 MG DAILY PT IS AWARE

## 2022-11-21 ENCOUNTER — TELEPHONE (OUTPATIENT)
Dept: FAMILY MEDICINE CLINIC | Facility: CLINIC | Age: 87
End: 2022-11-21

## 2022-11-21 NOTE — TELEPHONE ENCOUNTER
----- Message from Wendy Pedersen sent at 11/17/2022  2:28 PM EST -----  Subject: Appointment Request    Reason for Call: Established Patient Appointment needed: Routine Existing   Condition Follow Up    QUESTIONS    Reason for appointment request? No appointments available during search     Additional Information for Provider? patient will be out of town on 12/17   and would like to know if provider could see him for his 6m f/u appt prior   to this date.   ---------------------------------------------------------------------------  --------------  Bernard Alvarez MJSX  [phone]; OK to leave message on voicemail  ---------------------------------------------------------------------------  --------------  SCRIPT ANSWERS  COVID Screen: Jerrod Santos

## 2022-12-08 ENCOUNTER — NURSE ONLY (OUTPATIENT)
Dept: FAMILY MEDICINE CLINIC | Facility: CLINIC | Age: 87
End: 2022-12-08

## 2022-12-08 DIAGNOSIS — N18.4 CKD (CHRONIC KIDNEY DISEASE) STAGE 4, GFR 15-29 ML/MIN (HCC): Primary | ICD-10-CM

## 2022-12-08 DIAGNOSIS — R79.89 ELEVATED SERUM CREATININE: ICD-10-CM

## 2022-12-08 DIAGNOSIS — N18.4 CKD (CHRONIC KIDNEY DISEASE) STAGE 4, GFR 15-29 ML/MIN (HCC): ICD-10-CM

## 2022-12-09 LAB
ALBUMIN SERPL-MCNC: 3.8 G/DL (ref 3.2–4.6)
ALBUMIN/GLOB SERPL: 1.4 {RATIO} (ref 0.4–1.6)
ALP SERPL-CCNC: 76 U/L (ref 50–136)
ALT SERPL-CCNC: 20 U/L (ref 12–65)
ANION GAP SERPL CALC-SCNC: 7 MMOL/L (ref 2–11)
AST SERPL-CCNC: 18 U/L (ref 15–37)
BILIRUB SERPL-MCNC: 1.6 MG/DL (ref 0.2–1.1)
BUN SERPL-MCNC: 30 MG/DL (ref 8–23)
CALCIUM SERPL-MCNC: 9.2 MG/DL (ref 8.3–10.4)
CHLORIDE SERPL-SCNC: 108 MMOL/L (ref 101–110)
CO2 SERPL-SCNC: 27 MMOL/L (ref 21–32)
CREAT SERPL-MCNC: 2.3 MG/DL (ref 0.8–1.5)
GLOBULIN SER CALC-MCNC: 2.7 G/DL (ref 2.8–4.5)
GLUCOSE SERPL-MCNC: 107 MG/DL (ref 65–100)
POTASSIUM SERPL-SCNC: 4.2 MMOL/L (ref 3.5–5.1)
PROT SERPL-MCNC: 6.5 G/DL (ref 6.3–8.2)
SODIUM SERPL-SCNC: 142 MMOL/L (ref 133–143)

## 2022-12-11 RX ORDER — ISOSORBIDE MONONITRATE 30 MG/1
TABLET, EXTENDED RELEASE ORAL
Qty: 90 TABLET | Refills: 1 | Status: SHIPPED | OUTPATIENT
Start: 2022-12-11

## 2022-12-11 NOTE — RESULT ENCOUNTER NOTE
We need to call him , confirm and document the amount of lasix he is taking and then take half of it and recheck BUN and CR in 2 weeks

## 2022-12-12 RX ORDER — ATORVASTATIN CALCIUM 40 MG/1
40 TABLET, FILM COATED ORAL DAILY
Qty: 90 TABLET | Refills: 3 | Status: SHIPPED | OUTPATIENT
Start: 2022-12-12

## 2022-12-12 RX ORDER — ISOSORBIDE MONONITRATE 30 MG/1
30 TABLET, EXTENDED RELEASE ORAL DAILY
Qty: 90 TABLET | Refills: 3 | Status: SHIPPED | OUTPATIENT
Start: 2022-12-12

## 2022-12-12 NOTE — TELEPHONE ENCOUNTER
Refill:isosorbide  Dosage: 30 mg  Freq: 1 time daily  QTY: 90  To:  please print and put up front    Refill: atorvastatin  Dosage: 40 mg  Freq: 1 time daily  QTY: 90  To:  please print and put up front

## 2022-12-23 ENCOUNTER — TELEPHONE (OUTPATIENT)
Dept: FAMILY MEDICINE CLINIC | Facility: CLINIC | Age: 87
End: 2022-12-23

## 2022-12-23 NOTE — TELEPHONE ENCOUNTER
Called pt to notify results as per dr smith   We need to call him , confirm and document the amount of lasix he is taking and then take half of it and recheck BUN and CR in 2 weeks   Pt is aware

## 2023-01-27 ENCOUNTER — OFFICE VISIT (OUTPATIENT)
Dept: FAMILY MEDICINE CLINIC | Facility: CLINIC | Age: 88
End: 2023-01-27
Payer: MEDICARE

## 2023-01-27 VITALS
HEIGHT: 70 IN | WEIGHT: 217 LBS | BODY MASS INDEX: 31.07 KG/M2 | HEART RATE: 86 BPM | TEMPERATURE: 97.2 F | SYSTOLIC BLOOD PRESSURE: 122 MMHG | OXYGEN SATURATION: 97 % | DIASTOLIC BLOOD PRESSURE: 76 MMHG

## 2023-01-27 DIAGNOSIS — I10 ESSENTIAL (PRIMARY) HYPERTENSION: ICD-10-CM

## 2023-01-27 DIAGNOSIS — I50.9 CONGESTIVE HEART FAILURE, UNSPECIFIED HF CHRONICITY, UNSPECIFIED HEART FAILURE TYPE (HCC): Primary | ICD-10-CM

## 2023-01-27 DIAGNOSIS — N18.4 CKD (CHRONIC KIDNEY DISEASE) STAGE 4, GFR 15-29 ML/MIN (HCC): ICD-10-CM

## 2023-01-27 PROCEDURE — G8417 CALC BMI ABV UP PARAM F/U: HCPCS | Performed by: FAMILY MEDICINE

## 2023-01-27 PROCEDURE — G8427 DOCREV CUR MEDS BY ELIG CLIN: HCPCS | Performed by: FAMILY MEDICINE

## 2023-01-27 PROCEDURE — 1123F ACP DISCUSS/DSCN MKR DOCD: CPT | Performed by: FAMILY MEDICINE

## 2023-01-27 PROCEDURE — G8484 FLU IMMUNIZE NO ADMIN: HCPCS | Performed by: FAMILY MEDICINE

## 2023-01-27 PROCEDURE — 99213 OFFICE O/P EST LOW 20 MIN: CPT | Performed by: FAMILY MEDICINE

## 2023-01-27 PROCEDURE — 1036F TOBACCO NON-USER: CPT | Performed by: FAMILY MEDICINE

## 2023-01-27 SDOH — ECONOMIC STABILITY: FOOD INSECURITY: WITHIN THE PAST 12 MONTHS, YOU WORRIED THAT YOUR FOOD WOULD RUN OUT BEFORE YOU GOT MONEY TO BUY MORE.: NEVER TRUE

## 2023-01-27 SDOH — ECONOMIC STABILITY: FOOD INSECURITY: WITHIN THE PAST 12 MONTHS, THE FOOD YOU BOUGHT JUST DIDN'T LAST AND YOU DIDN'T HAVE MONEY TO GET MORE.: NEVER TRUE

## 2023-01-27 ASSESSMENT — PATIENT HEALTH QUESTIONNAIRE - PHQ9
1. LITTLE INTEREST OR PLEASURE IN DOING THINGS: 0
SUM OF ALL RESPONSES TO PHQ9 QUESTIONS 1 & 2: 0
SUM OF ALL RESPONSES TO PHQ QUESTIONS 1-9: 0
2. FEELING DOWN, DEPRESSED OR HOPELESS: 0
SUM OF ALL RESPONSES TO PHQ QUESTIONS 1-9: 0

## 2023-01-27 ASSESSMENT — SOCIAL DETERMINANTS OF HEALTH (SDOH): HOW HARD IS IT FOR YOU TO PAY FOR THE VERY BASICS LIKE FOOD, HOUSING, MEDICAL CARE, AND HEATING?: NOT HARD AT ALL

## 2023-02-01 ENCOUNTER — TELEPHONE (OUTPATIENT)
Dept: FAMILY MEDICINE CLINIC | Facility: CLINIC | Age: 88
End: 2023-02-01

## 2023-02-01 RX ORDER — METOPROLOL SUCCINATE 50 MG/1
TABLET, EXTENDED RELEASE ORAL
Qty: 90 TABLET | Refills: 1 | OUTPATIENT
Start: 2023-02-01

## 2023-02-01 NOTE — TELEPHONE ENCOUNTER
Patient states he was diagnosed with Covid on Monday at [facility]. He states they didn't give him anything to help.  Please call patient to advise at [phone]

## 2023-02-02 RX ORDER — AZITHROMYCIN 250 MG/1
250 TABLET, FILM COATED ORAL SEE ADMIN INSTRUCTIONS
Qty: 6 TABLET | Refills: 0 | Status: SHIPPED | OUTPATIENT
Start: 2023-02-02 | End: 2023-02-07

## 2023-02-07 ASSESSMENT — ENCOUNTER SYMPTOMS
EYES NEGATIVE: 1
RESPIRATORY NEGATIVE: 1
SHORTNESS OF BREATH: 0
GASTROINTESTINAL NEGATIVE: 1

## 2023-02-07 NOTE — PROGRESS NOTES
HISTORY OF PRESENT ILLNESS  Quinton Berumen is a 80 y.o. y.o. male    Hypertension  This is a recurrent problem. The problem has been gradually improving since onset. Pertinent negatives include no chest pain or shortness of breath. Congestive Heart Failure  Presents for follow-up visit. Pertinent negatives include no chest pain, shortness of breath or unexpected weight change. The symptoms have been improving. Compliance with total regimen is %. Compliance with diet is %. Compliance with exercise is %. Compliance with medications is %. Allergies   Allergen Reactions    Latex Other (See Comments) and Rash     Other reaction(s): Unknown        Current Outpatient Medications   Medication Sig    atorvastatin (LIPITOR) 40 MG tablet Take 1 tablet by mouth daily TAKE 1 TABLET BY MOUTH EVERY DAY    isosorbide mononitrate (IMDUR) 30 MG extended release tablet Take 1 tablet by mouth daily    furosemide (LASIX) 20 MG tablet Take 1 tablet by mouth daily    furosemide (LASIX) 40 MG tablet     tamsulosin (FLOMAX) 0.4 MG capsule Take 1 capsule by mouth daily    metoprolol succinate (TOPROL XL) 50 MG extended release tablet Take 1 tablet by mouth daily    aspirin 81 MG EC tablet Take by mouth daily    brimonidine (ALPHAGAN) 0.2 % ophthalmic solution APPLY 1 DROP IN RIGHT EYE TWICE A DAY    cetirizine (ZYRTEC) 10 MG tablet Take 10 mg by mouth    fexofenadine (ALLEGRA) 180 MG tablet Take 180 mg by mouth daily    folic acid (FOLVITE) 894 MCG tablet Take 800 mcg by mouth daily    latanoprost (XALATAN) 0.005 % ophthalmic solution Apply 1 drop to eye 2 times daily    omeprazole (PRILOSEC OTC) 20 MG tablet Take 20 mg by mouth    azithromycin (ZITHROMAX) 250 MG tablet Take 1 tablet by mouth See Admin Instructions for 5 days 500mg on day 1 followed by 250mg on days 2 - 5    gabapentin (NEURONTIN) 600 MG tablet Take 1 tablet by mouth 2 times daily for 90 days.      No current facility-administered medications for this visit. Past Medical History:   Diagnosis Date    Acute prostatitis in 2011    Anemia 11/5/2014    Asymptomatic varicose veins     CAD (coronary artery disease) 1/28/2014    CKD (chronic kidney disease)     Dr Latha Guevara follows- Stage III, Nov 2015 creatinine 1.9    Congestive heart failure, unspecified     Constipation     Elevated prostate specific antigen (PSA)     Elevated PSA     Family history of malignant neoplasm of prostate     Brother hadtherapy brachy    GERD (gastroesophageal reflux disease)     controlled with med. Glaucoma     managed with medication    Glucose intolerance (impaired glucose tolerance)     HLD (hyperlipidemia) 1/28/2014    Hx of echocardiogram 11/9/2015    LVEF 55-60%, biatrial enlargement, concentric hypertrophy of the left ventricle, mild MR, mild TR, aortic sclerosis without stenosis    Hyperparathyroidism, unspecified ([address]     Hypertension     controlled with med. 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 9)     BMI 34    Peripheral neuropathy     bilateral feet    Status post total right knee replacement 11/23/2015        Past Surgical History:   Procedure Laterality Date    CARDIAC CATHETERIZATION  1997    CATARACT REMOVAL Bilateral     with lens implants    CHOLECYSTECTOMY  2013    COLONOSCOPY  2014    polypectomy    HERNIA REPAIR Bilateral     inguinal hernia repair    ORTHOPEDIC SURGERY Right 2015    knee replacement    ORTHOPEDIC SURGERY      right knee open procedure     TONSILLECTOMY  childhood        Social History     Socioeconomic History    Marital status:       Spouse name: Not on file    Number of children: Not on file    Years of education: Not on file    Highest education level: Not on file   Occupational History    Not on file   Tobacco Use    Smoking status: Former     Packs/day: 0.50 Types: Cigarettes    Smokeless tobacco: Never    Tobacco comments:     Quit smoking: stopped in 1975   Substance and Sexual Activity    Alcohol use: Yes    Drug use: No    Sexual activity: Not on file   Other Topics Concern    Not on file   Social History Narrative    Not on file     Social Determinants of Health     Financial Resource Strain: Low Risk     Difficulty of Paying Living Expenses: Not hard at all   Food Insecurity: No Food Insecurity    Worried About Running Out of Food in the Last Year: Never true    Ran Out of Food in the Last Year: Never true   Transportation Needs: Not on file   Physical Activity: Inactive    Days of Exercise per Week: 0 days    Minutes of Exercise per Session: 0 min   Stress: Not on file   Social Connections: Not on file   Intimate Partner Violence: Not on file   Housing Stability: Not on file        Review of Systems   Constitutional: Negative. Negative for unexpected weight change. HENT: Negative. Eyes: Negative. Respiratory: Negative. Negative for shortness of breath. Cardiovascular: Negative. Negative for chest pain. Gastrointestinal: Negative. Endocrine: Negative. Genitourinary: Negative. Skin: Negative. Neurological: Negative. Psychiatric/Behavioral: Negative. /76   Pulse 86   Temp 97.2 °F (36.2 °C) (Temporal)   Ht 5' 10\" (1.778 m)   Wt 217 lb (98.4 kg)   SpO2 97%   BMI 31.14 kg/m²      Physical Exam  Constitutional:       General: He is not in acute distress. Appearance: Normal appearance. HENT:      Head: Normocephalic. Nose: Nose normal.   Eyes:      Conjunctiva/sclera: Conjunctivae normal.   Cardiovascular:      Rate and Rhythm: Normal rate. Pulmonary:      Effort: Pulmonary effort is normal.      Breath sounds: Normal breath sounds. Abdominal:      General: Abdomen is flat. Bowel sounds are normal.      Palpations: Abdomen is soft. Musculoskeletal:         General: Normal range of motion.       Cervical back: Normal range of motion. Skin:     General: Skin is warm and dry. Neurological:      General: No focal deficit present. Mental Status: He is alert. Psychiatric:         Mood and Affect: Mood normal.       Nurse Only on 12/08/2022   Component Date Value Ref Range Status    Sodium 12/08/2022 142  133 - 143 mmol/L Final    Potassium 12/08/2022 4.2  3.5 - 5.1 mmol/L Final    Chloride 12/08/2022 108  101 - 110 mmol/L Final    CO2 12/08/2022 27  21 - 32 mmol/L Final    Anion Gap 12/08/2022 7  2 - 11 mmol/L Final    Glucose 12/08/2022 107 (A)  65 - 100 mg/dL Final    BUN 12/08/2022 30 (A)  8 - 23 MG/DL Final    Creatinine 12/08/2022 2.30 (A)  0.8 - 1.5 MG/DL Final    Est, Glom Filt Rate 12/08/2022 26 (A)  >60 ml/min/1.73m2 Final    Comment:   Pediatric calculator link: [email or web address]/kdoqi/gfr_calculatorped    These results are not intended for use in patients <25years of age. eGFR results are calculated without a race factor using  the 2021 CKD-EPI equation. Careful clinical correlation is recommended, particularly when comparing to results calculated using previous equations. The CKD-EPI equation is less accurate in patients with extremes of muscle mass, extra-renal metabolism of creatinine, excessive creatine ingestion, or following therapy that affects renal tubular secretion.       Calcium 12/08/2022 9.2  8.3 - 10.4 MG/DL Final    Total Bilirubin 12/08/2022 1.6 (A)  0.2 - 1.1 MG/DL Final    ALT 12/08/2022 20  12 - 65 U/L Final    AST 12/08/2022 18  15 - 37 U/L Final    Alk Phosphatase 12/08/2022 76  50 - 136 U/L Final    Total Protein 12/08/2022 6.5  6.3 - 8.2 g/dL Final    Albumin 12/08/2022 3.8  3.2 - 4.6 g/dL Final    Globulin 12/08/2022 2.7 (A)  2.8 - 4.5 g/dL Final    Albumin/Globulin Ratio 12/08/2022 1.4  0.4 - 1.6   Final       ASSESSMENT and PLAN    Congestive heart failure, unspecified HF chronicity, unspecified heart failure type [facility])  Assessment & Plan:   Monitored by specialist- no acute findings meriting change in the plan    CKD (chronic kidney disease) stage 4, GFR 15-29 ml/min ([facility])  Assessment & Plan:   Monitored by specialist- no acute findings meriting change in the plan    Essential (primary) hypertension      Current treatment plan is effective. no changes in therapy  lab results and schedule for future lab studies reviewed with patient  Cardiovascular risk and specific lipid/ LDL goals reviewed    No follow-ups on file.      Avel Resendez MD

## 2023-02-09 ENCOUNTER — TELEPHONE (OUTPATIENT)
Dept: FAMILY MEDICINE CLINIC | Facility: CLINIC | Age: 88
End: 2023-02-09

## 2023-02-20 ENCOUNTER — OFFICE VISIT (OUTPATIENT)
Dept: FAMILY MEDICINE CLINIC | Facility: CLINIC | Age: 88
End: 2023-02-20
Payer: MEDICARE

## 2023-02-20 VITALS
WEIGHT: 212 LBS | TEMPERATURE: 97.2 F | BODY MASS INDEX: 30.35 KG/M2 | HEIGHT: 70 IN | SYSTOLIC BLOOD PRESSURE: 126 MMHG | HEART RATE: 80 BPM | DIASTOLIC BLOOD PRESSURE: 78 MMHG | OXYGEN SATURATION: 95 %

## 2023-02-20 DIAGNOSIS — N18.4 CKD (CHRONIC KIDNEY DISEASE) STAGE 4, GFR 15-29 ML/MIN (HCC): Primary | ICD-10-CM

## 2023-02-20 DIAGNOSIS — U09.9 POST COVID-19 CONDITION, UNSPECIFIED: ICD-10-CM

## 2023-02-20 PROCEDURE — G8417 CALC BMI ABV UP PARAM F/U: HCPCS | Performed by: FAMILY MEDICINE

## 2023-02-20 PROCEDURE — G8427 DOCREV CUR MEDS BY ELIG CLIN: HCPCS | Performed by: FAMILY MEDICINE

## 2023-02-20 PROCEDURE — G8484 FLU IMMUNIZE NO ADMIN: HCPCS | Performed by: FAMILY MEDICINE

## 2023-02-20 PROCEDURE — 1036F TOBACCO NON-USER: CPT | Performed by: FAMILY MEDICINE

## 2023-02-20 PROCEDURE — 99213 OFFICE O/P EST LOW 20 MIN: CPT | Performed by: FAMILY MEDICINE

## 2023-02-20 PROCEDURE — 1123F ACP DISCUSS/DSCN MKR DOCD: CPT | Performed by: FAMILY MEDICINE

## 2023-02-20 SDOH — ECONOMIC STABILITY: FOOD INSECURITY: WITHIN THE PAST 12 MONTHS, YOU WORRIED THAT YOUR FOOD WOULD RUN OUT BEFORE YOU GOT MONEY TO BUY MORE.: NEVER TRUE

## 2023-02-20 SDOH — ECONOMIC STABILITY: INCOME INSECURITY: HOW HARD IS IT FOR YOU TO PAY FOR THE VERY BASICS LIKE FOOD, HOUSING, MEDICAL CARE, AND HEATING?: NOT HARD AT ALL

## 2023-02-20 SDOH — ECONOMIC STABILITY: HOUSING INSECURITY
IN THE LAST 12 MONTHS, WAS THERE A TIME WHEN YOU DID NOT HAVE A STEADY PLACE TO SLEEP OR SLEPT IN A SHELTER (INCLUDING NOW)?: NO

## 2023-02-20 SDOH — ECONOMIC STABILITY: FOOD INSECURITY: WITHIN THE PAST 12 MONTHS, THE FOOD YOU BOUGHT JUST DIDN'T LAST AND YOU DIDN'T HAVE MONEY TO GET MORE.: NEVER TRUE

## 2023-02-20 ASSESSMENT — PATIENT HEALTH QUESTIONNAIRE - PHQ9
SUM OF ALL RESPONSES TO PHQ9 QUESTIONS 1 & 2: 0
SUM OF ALL RESPONSES TO PHQ QUESTIONS 1-9: 0
SUM OF ALL RESPONSES TO PHQ QUESTIONS 1-9: 0
2. FEELING DOWN, DEPRESSED OR HOPELESS: 0
SUM OF ALL RESPONSES TO PHQ QUESTIONS 1-9: 0
1. LITTLE INTEREST OR PLEASURE IN DOING THINGS: 0
SUM OF ALL RESPONSES TO PHQ QUESTIONS 1-9: 0

## 2023-02-20 ASSESSMENT — ENCOUNTER SYMPTOMS
GASTROINTESTINAL NEGATIVE: 1
EYES NEGATIVE: 1
RESPIRATORY NEGATIVE: 1

## 2023-02-20 NOTE — PROGRESS NOTES
HISTORY OF PRESENT ILLNESS  Arelis Turk is a 80 y.o. y.o. male    URI   This is a new (Post COVID 1/27 /2023 , recovered) problem. The current episode started today. The problem has been resolved. There has been no fever. Kidney Problem  This is a recurrent (CKD) problem. The problem has been unchanged. Allergies   Allergen Reactions    Latex Other (See Comments) and Rash     Other reaction(s): Unknown        Current Outpatient Medications   Medication Sig    atorvastatin (LIPITOR) 40 MG tablet Take 1 tablet by mouth daily TAKE 1 TABLET BY MOUTH EVERY DAY    isosorbide mononitrate (IMDUR) 30 MG extended release tablet Take 1 tablet by mouth daily    furosemide (LASIX) 20 MG tablet Take 1 tablet by mouth daily    furosemide (LASIX) 40 MG tablet     tamsulosin (FLOMAX) 0.4 MG capsule Take 1 capsule by mouth daily    metoprolol succinate (TOPROL XL) 50 MG extended release tablet Take 1 tablet by mouth daily    aspirin 81 MG EC tablet Take by mouth daily    brimonidine (ALPHAGAN) 0.2 % ophthalmic solution APPLY 1 DROP IN RIGHT EYE TWICE A DAY    cetirizine (ZYRTEC) 10 MG tablet Take 10 mg by mouth    fexofenadine (ALLEGRA) 180 MG tablet Take 180 mg by mouth daily    folic acid (FOLVITE) 440 MCG tablet Take 800 mcg by mouth daily    latanoprost (XALATAN) 0.005 % ophthalmic solution Apply 1 drop to eye 2 times daily    omeprazole (PRILOSEC OTC) 20 MG tablet Take 20 mg by mouth    gabapentin (NEURONTIN) 600 MG tablet Take 1 tablet by mouth 2 times daily for 90 days. No current facility-administered medications for this visit.         Past Medical History:   Diagnosis Date    Acute prostatitis in 2011    Anemia 11/5/2014    Asymptomatic varicose veins     CAD (coronary artery disease) 1/28/2014    CKD (chronic kidney disease)     Dr Jesica Sequeira follows- Stage III, Nov 2015 creatinine 1.9    Congestive heart failure, unspecified     Constipation     Elevated prostate specific antigen (PSA)     Elevated PSA Family history of malignant neoplasm of prostate     Brother hadtherapy brachy    GERD (gastroesophageal reflux disease)     controlled with med. Glaucoma     managed with medication    Glucose intolerance (impaired glucose tolerance)     HLD (hyperlipidemia) 1/28/2014    Hx of echocardiogram 11/9/2015    LVEF 55-60%, biatrial enlargement, concentric hypertrophy of the left ventricle, mild MR, mild TR, aortic sclerosis without stenosis    Hyperparathyroidism, unspecified ([address]     Hypertension     controlled with med. 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 9)     BMI 34    Peripheral neuropathy     bilateral feet    Status post total right knee replacement 11/23/2015        Past Surgical History:   Procedure Laterality Date    CARDIAC CATHETERIZATION  1997    CATARACT REMOVAL Bilateral     with lens implants    CHOLECYSTECTOMY  2013    COLONOSCOPY  2014    polypectomy    HERNIA REPAIR Bilateral     inguinal hernia repair    ORTHOPEDIC SURGERY Right 2015    knee replacement    ORTHOPEDIC SURGERY      right knee open procedure     TONSILLECTOMY  childhood        Social History     Socioeconomic History    Marital status:       Spouse name: Not on file    Number of children: Not on file    Years of education: Not on file    Highest education level: Not on file   Occupational History    Not on file   Tobacco Use    Smoking status: Former     Packs/day: 0.50     Types: Cigarettes    Smokeless tobacco: Never    Tobacco comments:     Quit smoking: stopped in 1975   Substance and Sexual Activity    Alcohol use: Yes    Drug use: No    Sexual activity: Not on file   Other Topics Concern    Not on file   Social History Narrative    Not on file     Social Determinants of Health     Financial Resource Strain: Low Risk     Difficulty of Paying Living Expenses: Not hard at all   Food Insecurity: No Food Insecurity    Worried About Running Out of Food in the Last Year: Never true    Ran Out of Food in the Last Year: Never true   Transportation Needs: Unknown    Lack of Transportation (Medical): Not on file    Lack of Transportation (Non-Medical): No   Physical Activity: Inactive    Days of Exercise per Week: 0 days    Minutes of Exercise per Session: 0 min   Stress: Not on file   Social Connections: Not on file   Intimate Partner Violence: Not on file   Housing Stability: Unknown    Unable to Pay for Housing in the Last Year: Not on file    Number of Places Lived in the Last Year: Not on file    Unstable Housing in the Last Year: No        Review of Systems   Constitutional: Negative. HENT: Negative. Eyes: Negative. Respiratory: Negative. Cardiovascular: Negative. Gastrointestinal: Negative. Endocrine: Negative. Genitourinary: Negative. Skin: Negative. Neurological: Negative. Psychiatric/Behavioral: Negative. /78   Pulse 80   Temp 97.2 °F (36.2 °C) (Temporal)   Ht 5' 10\" (1.778 m)   Wt 212 lb (96.2 kg)   SpO2 95%   BMI 30.42 kg/m²      Physical Exam  Constitutional:       General: He is not in acute distress. Appearance: Normal appearance. HENT:      Head: Normocephalic. Nose: Nose normal.   Eyes:      Conjunctiva/sclera: Conjunctivae normal.   Cardiovascular:      Rate and Rhythm: Normal rate. Pulmonary:      Effort: Pulmonary effort is normal.      Breath sounds: Normal breath sounds. Abdominal:      General: Abdomen is flat. Bowel sounds are normal.      Palpations: Abdomen is soft. Musculoskeletal:         General: Normal range of motion. Cervical back: Normal range of motion. Skin:     General: Skin is warm and dry. Neurological:      General: No focal deficit present. Mental Status: He is alert.    Psychiatric:         Mood and Affect: Mood normal.       Nurse Only on 12/08/2022   Component Date Value Ref Range Status    Sodium 12/08/2022 142  133 - 143 mmol/L Final    Potassium 12/08/2022 4.2  3.5 - 5.1 mmol/L Final    Chloride 12/08/2022 108  101 - 110 mmol/L Final    CO2 12/08/2022 27  21 - 32 mmol/L Final    Anion Gap 12/08/2022 7  2 - 11 mmol/L Final    Glucose 12/08/2022 107 (A)  65 - 100 mg/dL Final    BUN 12/08/2022 30 (A)  8 - 23 MG/DL Final    Creatinine 12/08/2022 2.30 (A)  0.8 - 1.5 MG/DL Final    Est, Glom Filt Rate 12/08/2022 26 (A)  >60 ml/min/1.73m2 Final    Comment:   Pediatric calculator link: [facility].[email or web address] org/professionals/kdoqi/gfr_calculatorped    These results are not intended for use in patients <25years of age. eGFR results are calculated without a race factor using  the 2021 CKD-EPI equation. Careful clinical correlation is recommended, particularly when comparing to results calculated using previous equations. The CKD-EPI equation is less accurate in patients with extremes of muscle mass, extra-renal metabolism of creatinine, excessive creatine ingestion, or following therapy that affects renal tubular secretion. Calcium 12/08/2022 9.2  8.3 - 10.4 MG/DL Final    Total Bilirubin 12/08/2022 1.6 (A)  0.2 - 1.1 MG/DL Final    ALT 12/08/2022 20  12 - 65 U/L Final    AST 12/08/2022 18  15 - 37 U/L Final    Alk Phosphatase 12/08/2022 76  50 - 136 U/L Final    Total Protein 12/08/2022 6.5  6.3 - 8.2 g/dL Final    Albumin 12/08/2022 3.8  3.2 - 4.6 g/dL Final    Globulin 12/08/2022 2.7 (A)  2.8 - 4.5 g/dL Final    Albumin/Globulin Ratio 12/08/2022 1.4  0.4 - 1.6   Final       ASSESSMENT and PLAN    CKD (chronic kidney disease) stage 4, GFR 15-29 ml/min ([facility])  -     Comprehensive Metabolic Panel; Future  Post covid-19 condition, unspecified      Current treatment plan is effective. no changes in therapy  lab results and schedule for future lab studies reviewed with patient  reviewed diet, exercise and weight control     No follow-ups on file.      Evette Trinidad MD

## 2023-02-21 ENCOUNTER — TELEPHONE (OUTPATIENT)
Dept: FAMILY MEDICINE CLINIC | Facility: CLINIC | Age: 88
End: 2023-02-21

## 2023-02-21 LAB
ALBUMIN SERPL-MCNC: 3.7 G/DL (ref 3.2–4.6)
ALBUMIN/GLOB SERPL: 1.2 (ref 0.4–1.6)
ALP SERPL-CCNC: 79 U/L (ref 50–136)
ALT SERPL-CCNC: 15 U/L (ref 12–65)
ANION GAP SERPL CALC-SCNC: 6 MMOL/L (ref 2–11)
AST SERPL-CCNC: 16 U/L (ref 15–37)
BILIRUB SERPL-MCNC: 1.4 MG/DL (ref 0.2–1.1)
BUN SERPL-MCNC: 27 MG/DL (ref 8–23)
CALCIUM SERPL-MCNC: 9.4 MG/DL (ref 8.3–10.4)
CHLORIDE SERPL-SCNC: 108 MMOL/L (ref 101–110)
CO2 SERPL-SCNC: 26 MMOL/L (ref 21–32)
CREAT SERPL-MCNC: 2 MG/DL (ref 0.8–1.5)
GLOBULIN SER CALC-MCNC: 3.1 G/DL (ref 2.8–4.5)
GLUCOSE SERPL-MCNC: 113 MG/DL (ref 65–100)
POTASSIUM SERPL-SCNC: 4.5 MMOL/L (ref 3.5–5.1)
PROT SERPL-MCNC: 6.8 G/DL (ref 6.3–8.2)
SODIUM SERPL-SCNC: 140 MMOL/L (ref 133–143)

## 2023-03-08 ENCOUNTER — OFFICE VISIT (OUTPATIENT)
Dept: UROLOGY | Age: 88
End: 2023-03-08
Payer: MEDICARE

## 2023-03-08 DIAGNOSIS — R97.20 ELEVATED PROSTATE SPECIFIC ANTIGEN (PSA): Primary | ICD-10-CM

## 2023-03-08 DIAGNOSIS — N40.0 BENIGN PROSTATIC HYPERPLASIA WITHOUT LOWER URINARY TRACT SYMPTOMS: ICD-10-CM

## 2023-03-08 LAB
BILIRUBIN, URINE, POC: NEGATIVE
BLOOD URINE, POC: NEGATIVE
GLUCOSE URINE, POC: NEGATIVE
KETONES, URINE, POC: NEGATIVE
LEUKOCYTE ESTERASE, URINE, POC: NORMAL
NITRITE, URINE, POC: NEGATIVE
PH, URINE, POC: 5.5 (ref 4.6–8)
PROTEIN,URINE, POC: NEGATIVE
SPECIFIC GRAVITY, URINE, POC: 1.01 (ref 1–1.03)
URINALYSIS CLARITY, POC: NORMAL
URINALYSIS COLOR, POC: NORMAL
UROBILINOGEN, POC: NORMAL

## 2023-03-08 PROCEDURE — G8484 FLU IMMUNIZE NO ADMIN: HCPCS | Performed by: UROLOGY

## 2023-03-08 PROCEDURE — 99213 OFFICE O/P EST LOW 20 MIN: CPT | Performed by: UROLOGY

## 2023-03-08 PROCEDURE — G8417 CALC BMI ABV UP PARAM F/U: HCPCS | Performed by: UROLOGY

## 2023-03-08 PROCEDURE — 81003 URINALYSIS AUTO W/O SCOPE: CPT | Performed by: UROLOGY

## 2023-03-08 PROCEDURE — G8428 CUR MEDS NOT DOCUMENT: HCPCS | Performed by: UROLOGY

## 2023-03-08 PROCEDURE — 1036F TOBACCO NON-USER: CPT | Performed by: UROLOGY

## 2023-03-08 PROCEDURE — 1123F ACP DISCUSS/DSCN MKR DOCD: CPT | Performed by: UROLOGY

## 2023-03-08 RX ORDER — TAMSULOSIN HYDROCHLORIDE 0.4 MG/1
0.4 CAPSULE ORAL DAILY
Qty: 90 CAPSULE | Refills: 3 | Status: SHIPPED | OUTPATIENT
Start: 2023-03-08

## 2023-03-08 NOTE — PROGRESS NOTES
[facility]  [address] [facility]  [phone]    Sydney Becerril  : 1931    Chief Complaint   Patient presents with    Follow-up        HPI     Sydney Becerril is a 80 y.o. male    With ELEVATED PSA, ABNORMAL PROSTATE, favorable % free PSA. Positive family history of prostate cancer in one brother. He has history of BPH and elevated PSA. Previous negative biopsy. . Also chronic renal failure. PSA 5.5 in . 4.89 with 35% free PSA in . Creatinine 1.8-2.0. PSA 4.39 with 39% free pSA in . PSA 5.16 in . Referred back because of PSA of 6.7 in 2012. PSA up to 8.3 in 2012. Olga Deanpe PSA in 2011 was 5.3 with 47.2% free component. He had acute prostatitis with Proteus in 2011. Had a negative renal ultrasound after that episode of acute prostatitis. PSA was 8.21 %free 33 (12.) Has good stream.   PSA 6.82 in 2014. PSA 9.6 in . PSA 8.7 in 2-15. PSA 13.4 in 9-15. States that he had UTI treated at [facility] in 8-15. Edwards that PSA elevation may have been due to UTI; I gave him another course of Bactrim in 9-15. FOX in 9-15 showed 3+ prostate without nodules. PSA 10.0 on 11-11-15,11.4 on 12-3-15. In 3-16, total PSA 8.1 with 28.5% free PSA. Voiding well at this time. PSA 6.9 in . PSA 9.2 in , 7.7 and 8.9 in . No recent UTI. [place] PSA 7.9 in . Started Tamsulosin in . no urinary complaints. PSA 6.5 in 3-. PSA 6.4 in . PSA 10.2 on 6-3-20, 9.9 on 20. PSA 6.7 in . PSA 8.1 in . He hasn't had any UTI since last year. Voiding well on tamsulosin . PSA 8.3 in  (done by his PCP Dr. Manfred Sunshine).      Past Medical History:   Diagnosis Date    Acute prostatitis in     Anemia 2014    Asymptomatic varicose veins     CAD (coronary artery disease) 2014    CKD (chronic kidney disease)     Dr Lulu Watson follows- Stage III, 2015 creatinine 1.9    Congestive heart failure, unspecified     Constipation     Elevated prostate specific antigen (PSA)     Elevated PSA     Family history of malignant neoplasm of prostate     Brother hadtherapy brachy    GERD (gastroesophageal reflux disease)     controlled with med. Glaucoma     managed with medication    Glucose intolerance (impaired glucose tolerance)     HLD (hyperlipidemia) 1/28/2014    Hx of echocardiogram 11/9/2015    LVEF 55-60%, biatrial enlargement, concentric hypertrophy of the left ventricle, mild MR, mild TR, aortic sclerosis without stenosis    Hyperparathyroidism, unspecified ([address]     Hypertension     controlled with med. 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 9)     BMI 34    Peripheral neuropathy     bilateral feet    Status post total right knee replacement 11/23/2015     Past Surgical History:   Procedure Laterality Date    CARDIAC CATHETERIZATION  1997    CATARACT REMOVAL Bilateral     with lens implants    CHOLECYSTECTOMY  2013    COLONOSCOPY  2014    polypectomy    HERNIA REPAIR Bilateral     inguinal hernia repair    ORTHOPEDIC SURGERY Right 2015    knee replacement    ORTHOPEDIC SURGERY      right knee open procedure     TONSILLECTOMY  childhood     Current Outpatient Medications   Medication Sig Dispense Refill    tamsulosin (FLOMAX) 0.4 MG capsule Take 1 capsule by mouth daily 90 capsule 3    atorvastatin (LIPITOR) 40 MG tablet Take 1 tablet by mouth daily TAKE 1 TABLET BY MOUTH EVERY DAY 90 tablet 3    isosorbide mononitrate (IMDUR) 30 MG extended release tablet Take 1 tablet by mouth daily 90 tablet 3    furosemide (LASIX) 20 MG tablet Take 1 tablet by mouth daily 90 tablet 3    furosemide (LASIX) 40 MG tablet       gabapentin (NEURONTIN) 600 MG tablet Take 1 tablet by mouth 2 times daily for 90 days.  30 tablet 5    metoprolol succinate (TOPROL XL) 50 MG extended release tablet Take 1 tablet by mouth daily 90 tablet 1    aspirin 81 MG EC tablet Take by mouth daily      brimonidine (ALPHAGAN) 0.2 % ophthalmic solution APPLY 1 DROP IN RIGHT EYE TWICE A DAY      cetirizine (ZYRTEC) 10 MG tablet Take 10 mg by mouth      fexofenadine (ALLEGRA) 180 MG tablet Take 180 mg by mouth daily      folic acid (FOLVITE) 420 MCG tablet Take 800 mcg by mouth daily      latanoprost (XALATAN) 0.005 % ophthalmic solution Apply 1 drop to eye 2 times daily      omeprazole (PRILOSEC OTC) 20 MG tablet Take 20 mg by mouth       No current facility-administered medications for this visit. Allergies   Allergen Reactions    Latex Other (See Comments) and Rash     Other reaction(s): Unknown     Social History     Socioeconomic History    Marital status:      Spouse name: Not on file    Number of children: Not on file    Years of education: Not on file    Highest education level: Not on file   Occupational History    Not on file   Tobacco Use    Smoking status: Former     Packs/day: 0.50     Types: Cigarettes    Smokeless tobacco: Never    Tobacco comments:     Quit smoking: stopped in 1975   Substance and Sexual Activity    Alcohol use: Yes    Drug use: No    Sexual activity: Not on file   Other Topics Concern    Not on file   Social History Narrative    Not on file     Social Determinants of Health     Financial Resource Strain: Low Risk     Difficulty of Paying Living Expenses: Not hard at all   Food Insecurity: No Food Insecurity    Worried About [facility] in the Last Year: Never true    [facility] in the Last Year: Never true   Transportation Needs: Unknown    Lack of Transportation (Medical): Not on file    Lack of Transportation (Non-Medical):  No   Physical Activity: Inactive    Days of Exercise per Week: 0 days    Minutes of Exercise per Session: 0 min   Stress: Not on file   Social Connections: Not on file   Intimate Partner Violence: Not on file   Housing Stability: Unknown    Unable to Pay for Housing in the Last Year: Not on file    Number of Places Lived in the Last Year: Not on file    Unstable Housing in the Last Year: No     Family History   Problem Relation Age of Onset    Heart Disease Father     Heart Disease Mother        ROS    There were no vitals taken for this visit. Urinalysis  UA - Dipstick  Results for orders placed or performed in visit on 03/08/23   AMB POC URINALYSIS DIP STICK AUTO W/O MICRO   Result Value Ref Range    Color (UA POC)      Clarity (UA POC)      Glucose, Urine, POC Negative Negative    Bilirubin, Urine, POC Negative Negative    KETONES, Urine, POC Negative Negative    Specific Gravity, Urine, POC 1.015 1.001 - 1.035    Blood (UA POC) Negative Negative    pH, Urine, POC 5.5 4.6 - 8.0    Protein, Urine, POC Negative Negative    Urobilinogen, POC 0.2 mg/dL     Nitrite, Urine, POC Negative Negative    Leukocyte Esterase, Urine, POC Small Negative   Results for orders placed or performed in visit on 06/10/21   AMB POC URINALYSIS DIP STICK AUTO W/O MICRO   Result Value Ref Range    Color (UA POC) Yellow     Clarity (UA POC) Clear     Glucose, Urine, POC Negative Negative    Bilirubin, Urine, POC Negative Negative    Ketones, Urine, POC Negative Negative    Specific Gravity, Urine, POC 1.015 1.001 - 1.035 NA    Blood (UA POC) Negative Negative    pH, Urine, POC 6.0 4.6 - 8.0 NA    Protein, Urine, POC Negative Negative    Urobilinogen, POC normal 0.2 - 1    Nitrite, Urine, POC Negative Negative    Leukocyte Esterase, Urine, POC Negative Negative       UA - Micro  WBC - 0  RBC - 0  Bacteria - 0  Epith - 0    Assessment and Plan   Diagnosis Orders   1. Elevated prostate specific antigen (PSA)  AMB POC URINALYSIS DIP STICK AUTO W/O MICRO      2.  Benign prostatic hyperplasia without lower urinary tract symptoms  AMB POC URINALYSIS DIP STICK AUTO W/O MICRO    tamsulosin (FLOMAX) 0.4 MG capsule             Orders Placed This Encounter Procedures    AMB POC URINALYSIS DIP STICK AUTO W/O MICRO     Doing well on tamsulosin. Follow-up and Dispositions    Return in about 1 year (around 3/8/2024) for Return to see Nurse Practitioner. Return in about 1 year (around 3/8/2024) for Return to see Nurse Practitioner. Review PSA from PCP then.

## 2023-05-22 ENCOUNTER — OFFICE VISIT (OUTPATIENT)
Dept: FAMILY MEDICINE CLINIC | Facility: CLINIC | Age: 88
End: 2023-05-22
Payer: MEDICARE

## 2023-05-22 VITALS
HEIGHT: 70 IN | DIASTOLIC BLOOD PRESSURE: 72 MMHG | TEMPERATURE: 97.2 F | SYSTOLIC BLOOD PRESSURE: 116 MMHG | BODY MASS INDEX: 30.21 KG/M2 | WEIGHT: 211 LBS | HEART RATE: 67 BPM | OXYGEN SATURATION: 95 %

## 2023-05-22 DIAGNOSIS — I20.9 ANGINA PECTORIS, UNSPECIFIED (HCC): ICD-10-CM

## 2023-05-22 DIAGNOSIS — E53.8 B12 DEFICIENCY: ICD-10-CM

## 2023-05-22 DIAGNOSIS — N18.4 CKD (CHRONIC KIDNEY DISEASE) STAGE 4, GFR 15-29 ML/MIN (HCC): Primary | ICD-10-CM

## 2023-05-22 DIAGNOSIS — I10 ESSENTIAL (PRIMARY) HYPERTENSION: ICD-10-CM

## 2023-05-22 DIAGNOSIS — I25.119 ATHEROSCLEROSIS OF NATIVE CORONARY ARTERY OF NATIVE HEART WITH ANGINA PECTORIS (HCC): ICD-10-CM

## 2023-05-22 LAB
ALBUMIN SERPL-MCNC: 3.4 G/DL (ref 3.2–4.6)
ALBUMIN/GLOB SERPL: 1.2 (ref 0.4–1.6)
ALP SERPL-CCNC: 88 U/L (ref 50–136)
ALT SERPL-CCNC: 20 U/L (ref 12–65)
ANION GAP SERPL CALC-SCNC: 5 MMOL/L (ref 2–11)
AST SERPL-CCNC: 15 U/L (ref 15–37)
BILIRUB SERPL-MCNC: 1.2 MG/DL (ref 0.2–1.1)
BUN SERPL-MCNC: 31 MG/DL (ref 8–23)
CALCIUM SERPL-MCNC: 8.9 MG/DL (ref 8.3–10.4)
CHLORIDE SERPL-SCNC: 109 MMOL/L (ref 101–110)
CO2 SERPL-SCNC: 27 MMOL/L (ref 21–32)
CREAT SERPL-MCNC: 2.2 MG/DL (ref 0.8–1.5)
GLOBULIN SER CALC-MCNC: 2.8 G/DL (ref 2.8–4.5)
GLUCOSE SERPL-MCNC: 107 MG/DL (ref 65–100)
POTASSIUM SERPL-SCNC: 4.3 MMOL/L (ref 3.5–5.1)
PROT SERPL-MCNC: 6.2 G/DL (ref 6.3–8.2)
SODIUM SERPL-SCNC: 141 MMOL/L (ref 133–143)

## 2023-05-22 PROCEDURE — 99213 OFFICE O/P EST LOW 20 MIN: CPT | Performed by: FAMILY MEDICINE

## 2023-05-22 PROCEDURE — G8427 DOCREV CUR MEDS BY ELIG CLIN: HCPCS | Performed by: FAMILY MEDICINE

## 2023-05-22 PROCEDURE — 1036F TOBACCO NON-USER: CPT | Performed by: FAMILY MEDICINE

## 2023-05-22 PROCEDURE — 96372 THER/PROPH/DIAG INJ SC/IM: CPT | Performed by: FAMILY MEDICINE

## 2023-05-22 PROCEDURE — G8417 CALC BMI ABV UP PARAM F/U: HCPCS | Performed by: FAMILY MEDICINE

## 2023-05-22 PROCEDURE — 1123F ACP DISCUSS/DSCN MKR DOCD: CPT | Performed by: FAMILY MEDICINE

## 2023-05-22 RX ORDER — CYANOCOBALAMIN 1000 UG/ML
1000 INJECTION, SOLUTION INTRAMUSCULAR; SUBCUTANEOUS ONCE
Status: COMPLETED | OUTPATIENT
Start: 2023-05-22 | End: 2023-05-22

## 2023-05-22 RX ADMIN — CYANOCOBALAMIN 1000 MCG: 1000 INJECTION, SOLUTION INTRAMUSCULAR; SUBCUTANEOUS at 10:47

## 2023-05-22 ASSESSMENT — PATIENT HEALTH QUESTIONNAIRE - PHQ9
SUM OF ALL RESPONSES TO PHQ9 QUESTIONS 1 & 2: 0
1. LITTLE INTEREST OR PLEASURE IN DOING THINGS: 0
SUM OF ALL RESPONSES TO PHQ QUESTIONS 1-9: 0
2. FEELING DOWN, DEPRESSED OR HOPELESS: 0

## 2023-05-23 RX ORDER — FUROSEMIDE 20 MG/1
20 TABLET ORAL DAILY
Qty: 90 TABLET | Refills: 3 | Status: SHIPPED | OUTPATIENT
Start: 2023-05-23

## 2023-05-31 PROBLEM — I20.9 ANGINA PECTORIS, UNSPECIFIED (HCC): Status: ACTIVE | Noted: 2023-05-31

## 2023-05-31 PROBLEM — I25.119 ATHEROSCLEROTIC HEART DISEASE OF NATIVE CORONARY ARTERY WITH UNSPECIFIED ANGINA PECTORIS (HCC): Status: ACTIVE | Noted: 2023-05-31

## 2023-05-31 ASSESSMENT — ENCOUNTER SYMPTOMS
RESPIRATORY NEGATIVE: 1
CHEST TIGHTNESS: 0
EYES NEGATIVE: 1
SHORTNESS OF BREATH: 0
GASTROINTESTINAL NEGATIVE: 1

## 2023-05-31 NOTE — PROGRESS NOTES
HISTORY OF PRESENT ILLNESS  Kaleigh Hilton is a 80 y.o. y.o. male    Kidney Problem  This is a recurrent problem. The problem has been unchanged. Pertinent negatives include no chest pain. Hypertension  This is a recurrent problem. The problem is unchanged. The problem is controlled. Pertinent negatives include no chest pain, palpitations or shortness of breath. Coronary Artery Disease  Presents for follow-up visit. Pertinent negatives include no chest pain, chest pressure, chest tightness, dizziness, leg swelling, palpitations or shortness of breath. The symptoms have been stable. Compliance with diet is good. Compliance with exercise is good. Compliance with medications is good. Allergies   Allergen Reactions    Latex Other (See Comments) and Rash     Other reaction(s): Unknown        Current Outpatient Medications   Medication Sig    hydrocortisone 2.5 % cream Apply topically 2 times daily. furosemide (LASIX) 20 MG tablet Take 1 tablet by mouth daily    tamsulosin (FLOMAX) 0.4 MG capsule Take 1 capsule by mouth daily    atorvastatin (LIPITOR) 40 MG tablet Take 1 tablet by mouth daily TAKE 1 TABLET BY MOUTH EVERY DAY    isosorbide mononitrate (IMDUR) 30 MG extended release tablet Take 1 tablet by mouth daily    metoprolol succinate (TOPROL XL) 50 MG extended release tablet Take 1 tablet by mouth daily    aspirin 81 MG EC tablet Take by mouth daily    brimonidine (ALPHAGAN) 0.2 % ophthalmic solution APPLY 1 DROP IN RIGHT EYE TWICE A DAY    cetirizine (ZYRTEC) 10 MG tablet Take 1 tablet by mouth    fexofenadine (ALLEGRA) 180 MG tablet Take 1 tablet by mouth daily    folic acid (FOLVITE) 807 MCG tablet Take 1 tablet by mouth daily    latanoprost (XALATAN) 0.005 % ophthalmic solution Apply 1 drop to eye 2 times daily    omeprazole (PRILOSEC OTC) 20 MG tablet Take 1 tablet by mouth    gabapentin (NEURONTIN) 600 MG tablet Take 1 tablet by mouth 2 times daily for 90 days.      No current

## 2023-06-19 ENCOUNTER — OFFICE VISIT (OUTPATIENT)
Dept: ENT CLINIC | Age: 88
End: 2023-06-19
Payer: MEDICARE

## 2023-06-19 VITALS — BODY MASS INDEX: 30.29 KG/M2 | HEIGHT: 70 IN | WEIGHT: 211.6 LBS | HEART RATE: 82 BPM | OXYGEN SATURATION: 96 %

## 2023-06-19 DIAGNOSIS — H61.23 EXCESSIVE CERUMEN IN EAR CANAL, BILATERAL: Primary | ICD-10-CM

## 2023-06-19 PROCEDURE — 69210 REMOVE IMPACTED EAR WAX UNI: CPT | Performed by: STUDENT IN AN ORGANIZED HEALTH CARE EDUCATION/TRAINING PROGRAM

## 2023-06-19 ASSESSMENT — ENCOUNTER SYMPTOMS
EYE REDNESS: 0
EYE ITCHING: 0
VOMITING: 0
SHORTNESS OF BREATH: 0

## 2023-06-19 NOTE — PROGRESS NOTES
[address] ENT Office Note    Patient: Pat Kramer  MRN: [id number]  : 1931  Gender:  male  Vital Signs: Pulse 82   Ht 5' 10\" (1.778 m)   Wt 211 lb 9.6 oz (96 kg)   SpO2 96%   BMI 30.36 kg/m²   Date: 2023    CC:   Chief Complaint   Patient presents with    Follow-up     6 month cerumen        HPI:  Pat Kramer is a 80 y.o. male here for routine cerumen debridement. No other issues. Past Medical History, Past Surgical History, Family history, Social History, and Medications were all reviewed with the patient today and updated as necessary. Allergies   Allergen Reactions    Latex Other (See Comments) and Rash     Other reaction(s): Unknown    Adhesive Tape Dermatitis and Rash     Other reaction(s): Rash-Allergy  Tape, BLISTERS       Patient Active Problem List   Diagnosis    CAD (coronary artery disease)    Mitral valve insufficiency    Esophageal reflux    Glaucoma    HLD (hyperlipidemia)    Chronic kidney disease, stage I    BPH (benign prostatic hyperplasia)    Pure hypercholesterolemia    Elevated prostate specific antigen (PSA)    Impotence of organic origin    Status post total right knee replacement    Anemia    Acute prostatitis    Family history of malignant neoplasm of prostate    Neuropathy    Hypertension    CKD (chronic kidney disease) stage 4, GFR 15-29 ml/min (HCC)    Congestive heart failure, unspecified HF chronicity, unspecified heart failure type (HCC)    Angina pectoris, unspecified    Atherosclerotic heart disease of native coronary artery with unspecified angina pectoris     Current Outpatient Medications   Medication Sig    hydrocortisone 2.5 % cream Apply topically 2 times daily.     furosemide (LASIX) 20 MG tablet Take 1 tablet by mouth daily    tamsulosin (FLOMAX) 0.4 MG capsule Take 1 capsule by mouth daily    atorvastatin (LIPITOR) 40 MG tablet Take 1 tablet by mouth daily TAKE 1 TABLET BY MOUTH EVERY DAY    isosorbide mononitrate (IMDUR) 30 MG extended

## 2023-08-23 ENCOUNTER — OFFICE VISIT (OUTPATIENT)
Dept: FAMILY MEDICINE CLINIC | Facility: CLINIC | Age: 88
End: 2023-08-23
Payer: MEDICARE

## 2023-08-23 VITALS
SYSTOLIC BLOOD PRESSURE: 110 MMHG | TEMPERATURE: 97.2 F | BODY MASS INDEX: 30.64 KG/M2 | OXYGEN SATURATION: 96 % | WEIGHT: 214 LBS | DIASTOLIC BLOOD PRESSURE: 68 MMHG | HEART RATE: 77 BPM | HEIGHT: 70 IN

## 2023-08-23 DIAGNOSIS — E78.5 HYPERLIPIDEMIA, UNSPECIFIED HYPERLIPIDEMIA TYPE: ICD-10-CM

## 2023-08-23 DIAGNOSIS — Z13.89 SCREENING FOR BLOOD OR PROTEIN IN URINE: ICD-10-CM

## 2023-08-23 DIAGNOSIS — R79.89 ELEVATED SERUM CREATININE: ICD-10-CM

## 2023-08-23 DIAGNOSIS — N18.4 CKD (CHRONIC KIDNEY DISEASE) STAGE 4, GFR 15-29 ML/MIN (HCC): ICD-10-CM

## 2023-08-23 DIAGNOSIS — Z12.5 SCREENING PSA (PROSTATE SPECIFIC ANTIGEN): ICD-10-CM

## 2023-08-23 DIAGNOSIS — Z00.00 MEDICARE ANNUAL WELLNESS VISIT, SUBSEQUENT: Primary | ICD-10-CM

## 2023-08-23 DIAGNOSIS — E78.2 MIXED HYPERLIPIDEMIA: ICD-10-CM

## 2023-08-23 DIAGNOSIS — I10 ESSENTIAL (PRIMARY) HYPERTENSION: ICD-10-CM

## 2023-08-23 DIAGNOSIS — C61 PROSTATE CANCER (HCC): ICD-10-CM

## 2023-08-23 LAB
ALBUMIN SERPL-MCNC: 3.7 G/DL (ref 3.2–4.6)
ALBUMIN/GLOB SERPL: 1.3 (ref 0.4–1.6)
ALP SERPL-CCNC: 102 U/L (ref 50–136)
ALT SERPL-CCNC: 20 U/L (ref 12–65)
ANION GAP SERPL CALC-SCNC: 7 MMOL/L (ref 2–11)
APPEARANCE UR: CLEAR
AST SERPL-CCNC: 20 U/L (ref 15–37)
BACTERIA URNS QL MICRO: NEGATIVE /HPF
BASOPHILS # BLD: 0 K/UL (ref 0–0.2)
BASOPHILS NFR BLD: 1 % (ref 0–2)
BILIRUB SERPL-MCNC: 1 MG/DL (ref 0.2–1.1)
BILIRUB UR QL: NEGATIVE
BUN SERPL-MCNC: 29 MG/DL (ref 8–23)
CALCIUM SERPL-MCNC: 8.9 MG/DL (ref 8.3–10.4)
CASTS URNS QL MICRO: ABNORMAL /LPF (ref 0–2)
CHLORIDE SERPL-SCNC: 111 MMOL/L (ref 101–110)
CHOLEST SERPL-MCNC: 153 MG/DL
CO2 SERPL-SCNC: 28 MMOL/L (ref 21–32)
COLOR UR: ABNORMAL
CREAT SERPL-MCNC: 2.1 MG/DL (ref 0.8–1.5)
DIFFERENTIAL METHOD BLD: ABNORMAL
EOSINOPHIL # BLD: 0.2 K/UL (ref 0–0.8)
EOSINOPHIL NFR BLD: 4 % (ref 0.5–7.8)
EPI CELLS #/AREA URNS HPF: ABNORMAL /HPF (ref 0–5)
ERYTHROCYTE [DISTWIDTH] IN BLOOD BY AUTOMATED COUNT: 14.1 % (ref 11.9–14.6)
GLOBULIN SER CALC-MCNC: 2.8 G/DL (ref 2.8–4.5)
GLUCOSE SERPL-MCNC: 109 MG/DL (ref 65–100)
GLUCOSE UR STRIP.AUTO-MCNC: NEGATIVE MG/DL
HCT VFR BLD AUTO: 42.7 % (ref 41.1–50.3)
HDLC SERPL-MCNC: 42 MG/DL (ref 40–60)
HDLC SERPL: 3.6
HGB BLD-MCNC: 13.9 G/DL (ref 13.6–17.2)
HGB UR QL STRIP: NEGATIVE
IMM GRANULOCYTES # BLD AUTO: 0 K/UL (ref 0–0.5)
IMM GRANULOCYTES NFR BLD AUTO: 0 % (ref 0–5)
KETONES UR QL STRIP.AUTO: NEGATIVE MG/DL
LDLC SERPL CALC-MCNC: 73.4 MG/DL
LEUKOCYTE ESTERASE UR QL STRIP.AUTO: ABNORMAL
LYMPHOCYTES # BLD: 1.4 K/UL (ref 0.5–4.6)
LYMPHOCYTES NFR BLD: 27 % (ref 13–44)
MCH RBC QN AUTO: 31.1 PG (ref 26.1–32.9)
MCHC RBC AUTO-ENTMCNC: 32.6 G/DL (ref 31.4–35)
MCV RBC AUTO: 95.5 FL (ref 82–102)
MONOCYTES # BLD: 0.6 K/UL (ref 0.1–1.3)
MONOCYTES NFR BLD: 12 % (ref 4–12)
MUCOUS THREADS URNS QL MICRO: 0 /LPF
NEUTS SEG # BLD: 2.9 K/UL (ref 1.7–8.2)
NEUTS SEG NFR BLD: 56 % (ref 43–78)
NITRITE UR QL STRIP.AUTO: NEGATIVE
NRBC # BLD: 0 K/UL (ref 0–0.2)
PH UR STRIP: 6 (ref 5–9)
PLATELET # BLD AUTO: 149 K/UL (ref 150–450)
PMV BLD AUTO: 12.8 FL (ref 9.4–12.3)
POTASSIUM SERPL-SCNC: 4.5 MMOL/L (ref 3.5–5.1)
PROT SERPL-MCNC: 6.5 G/DL (ref 6.3–8.2)
PROT UR STRIP-MCNC: NEGATIVE MG/DL
PSA SERPL-MCNC: 9.4 NG/ML
RBC # BLD AUTO: 4.47 M/UL (ref 4.23–5.6)
RBC #/AREA URNS HPF: ABNORMAL /HPF (ref 0–5)
SODIUM SERPL-SCNC: 146 MMOL/L (ref 133–143)
SP GR UR REFRACTOMETRY: 1.01 (ref 1–1.02)
TRIGL SERPL-MCNC: 188 MG/DL (ref 35–150)
TSH, 3RD GENERATION: 2.21 UIU/ML (ref 0.36–3.74)
URINE CULTURE IF INDICATED: ABNORMAL
UROBILINOGEN UR QL STRIP.AUTO: 0.2 EU/DL (ref 0.2–1)
VLDLC SERPL CALC-MCNC: 37.6 MG/DL (ref 6–23)
WBC # BLD AUTO: 5.2 K/UL (ref 4.3–11.1)
WBC URNS QL MICRO: ABNORMAL /HPF (ref 0–4)

## 2023-08-23 PROCEDURE — G0439 PPPS, SUBSEQ VISIT: HCPCS | Performed by: FAMILY MEDICINE

## 2023-08-23 PROCEDURE — 1123F ACP DISCUSS/DSCN MKR DOCD: CPT | Performed by: FAMILY MEDICINE

## 2023-08-23 RX ORDER — ATORVASTATIN CALCIUM 40 MG/1
40 TABLET, FILM COATED ORAL DAILY
Qty: 90 TABLET | Refills: 3 | Status: CANCELLED | OUTPATIENT
Start: 2023-08-23

## 2023-08-23 RX ORDER — METOPROLOL SUCCINATE 50 MG/1
50 TABLET, EXTENDED RELEASE ORAL DAILY
Qty: 90 TABLET | Refills: 3 | Status: SHIPPED | OUTPATIENT
Start: 2023-08-23

## 2023-08-23 ASSESSMENT — PATIENT HEALTH QUESTIONNAIRE - PHQ9
SUM OF ALL RESPONSES TO PHQ QUESTIONS 1-9: 0
SUM OF ALL RESPONSES TO PHQ9 QUESTIONS 1 & 2: 0
SUM OF ALL RESPONSES TO PHQ QUESTIONS 1-9: 0
2. FEELING DOWN, DEPRESSED OR HOPELESS: 0
SUM OF ALL RESPONSES TO PHQ QUESTIONS 1-9: 0
SUM OF ALL RESPONSES TO PHQ QUESTIONS 1-9: 0
1. LITTLE INTEREST OR PLEASURE IN DOING THINGS: 0

## 2023-08-29 ENCOUNTER — TELEPHONE (OUTPATIENT)
Dept: FAMILY MEDICINE CLINIC | Facility: CLINIC | Age: 88
End: 2023-08-29

## 2023-08-29 NOTE — TELEPHONE ENCOUNTER
CALLED PT TO NOTIFY RESULTS AS PER DR WHITE   Labs stable   PT IS AWARE   BUT IS ASKING [address]

## 2023-08-29 NOTE — TELEPHONE ENCOUNTER
AS PER DR BROWN   As needed , confirm that is the way he is taking it , if his weight goes up by 3 # s and he sees increasing swelling in his legs .  He needs to weigh every day   CALLED PT BACK HE STATED THAT HE IS TAKING LASIX AND TAKING 1/2 DAILY WILL CHECK WEIGHT AND CALL ME PT IS AWARE

## 2023-08-30 ENCOUNTER — TELEPHONE (OUTPATIENT)
Dept: FAMILY MEDICINE CLINIC | Facility: CLINIC | Age: 88
End: 2023-08-30

## 2023-08-31 ENCOUNTER — TELEPHONE (OUTPATIENT)
Dept: FAMILY MEDICINE CLINIC | Facility: CLINIC | Age: 88
End: 2023-08-31

## 2023-08-31 NOTE — PROGRESS NOTES
Medication Sig Taking? Authorizing Provider   metoprolol succinate (TOPROL XL) 50 MG extended release tablet Take 1 tablet by mouth daily Yes Song Bolton MD   hydrocortisone 2.5 % cream Apply topically 2 times daily. Yes Song Bolton MD   furosemide (LASIX) 20 MG tablet Take 1 tablet by mouth daily Yes Song Bolton MD   tamsulosin (FLOMAX) 0.4 MG capsule Take 1 capsule by mouth daily Yes Addie Boucher MD   atorvastatin (LIPITOR) 40 MG tablet Take 1 tablet by mouth daily TAKE 1 TABLET BY MOUTH EVERY DAY Yes Song Bolton MD   isosorbide mononitrate (IMDUR) 30 MG extended release tablet Take 1 tablet by mouth daily Yes Song Bolton MD   aspirin 81 MG EC tablet Take by mouth daily Yes Ar Automatic Reconciliation   brimonidine (ALPHAGAN) 0.2 % ophthalmic solution APPLY 1 DROP IN RIGHT EYE TWICE A DAY Yes Ar Automatic Reconciliation   cetirizine (ZYRTEC) 10 MG tablet Take 1 tablet by mouth Yes Ar Automatic Reconciliation   fexofenadine (ALLEGRA) 180 MG tablet Take 1 tablet by mouth daily Yes Ar Automatic Reconciliation   folic acid (FOLVITE) 398 MCG tablet Take 1 tablet by mouth daily Yes Ar Automatic Reconciliation   latanoprost (XALATAN) 0.005 % ophthalmic solution Apply 1 drop to eye 2 times daily Yes Ar Automatic Reconciliation   omeprazole (PRILOSEC OTC) 20 MG tablet Take 1 tablet by mouth Yes Ar Automatic Reconciliation   gabapentin (NEURONTIN) 600 MG tablet Take 1 tablet by mouth 2 times daily for 90 days.   Song Bolton MD       [facility] (Including outside providers/suppliers regularly involved in providing care):   Patient Care Team:  Song Bolton MD as PCP - López Slaughter MD as PCP - Empaneled Provider  Maria Ashley MD as Physician     Reviewed and updated this visit:  Allergies  Meds  Med Hx  Surg Hx  Soc Hx  Fam Hx

## 2023-09-08 ENCOUNTER — TELEPHONE (OUTPATIENT)
Dept: FAMILY MEDICINE CLINIC | Facility: CLINIC | Age: 88
End: 2023-09-08

## 2023-09-08 NOTE — TELEPHONE ENCOUNTER
Sept 4: 200 lbs  Sept 5: 201 lbs  Sept 6: 200 lbs  Sept 7: 202 lbs  Sept 8: 200 lbs    Pt wants to know how much longer he needs to be doing this for

## 2023-09-20 ENCOUNTER — OFFICE VISIT (OUTPATIENT)
Dept: FAMILY MEDICINE CLINIC | Facility: CLINIC | Age: 88
End: 2023-09-20

## 2023-09-20 VITALS
SYSTOLIC BLOOD PRESSURE: 122 MMHG | DIASTOLIC BLOOD PRESSURE: 72 MMHG | TEMPERATURE: 97.2 F | BODY MASS INDEX: 29.35 KG/M2 | HEIGHT: 70 IN | OXYGEN SATURATION: 94 % | HEART RATE: 80 BPM | WEIGHT: 205 LBS

## 2023-09-20 DIAGNOSIS — M79.605 PAIN OF LEFT LOWER EXTREMITY: Primary | ICD-10-CM

## 2023-09-20 RX ORDER — METHYLPREDNISOLONE SODIUM SUCCINATE 40 MG/ML
40 INJECTION, POWDER, LYOPHILIZED, FOR SOLUTION INTRAMUSCULAR; INTRAVENOUS ONCE
Status: COMPLETED | OUTPATIENT
Start: 2023-09-20 | End: 2023-09-20

## 2023-09-20 RX ADMIN — METHYLPREDNISOLONE SODIUM SUCCINATE 40 MG: 40 INJECTION, POWDER, LYOPHILIZED, FOR SOLUTION INTRAMUSCULAR; INTRAVENOUS at 15:15

## 2023-09-22 ENCOUNTER — TELEPHONE (OUTPATIENT)
Dept: FAMILY MEDICINE CLINIC | Facility: CLINIC | Age: 88
End: 2023-09-22

## 2023-09-22 NOTE — TELEPHONE ENCOUNTER
Patient called back. He wanted to give update on condition and would like a call back. Left leg pain is getting worse and no subsiding with OTC pain medication (Throbbing). Patient is weighing in at 200lbs.

## 2023-09-22 NOTE — TELEPHONE ENCOUNTER
Patient called per Dr. Jordy Khan instructions regarding his situation.  Please call him back at [phone]

## 2023-09-29 ENCOUNTER — OFFICE VISIT (OUTPATIENT)
Dept: FAMILY MEDICINE CLINIC | Facility: CLINIC | Age: 88
End: 2023-09-29

## 2023-09-29 VITALS
HEART RATE: 85 BPM | WEIGHT: 210 LBS | OXYGEN SATURATION: 95 % | DIASTOLIC BLOOD PRESSURE: 72 MMHG | HEIGHT: 70 IN | TEMPERATURE: 97.1 F | SYSTOLIC BLOOD PRESSURE: 124 MMHG | BODY MASS INDEX: 30.06 KG/M2

## 2023-09-29 DIAGNOSIS — M79.605 PAIN OF LEFT LOWER EXTREMITY: Primary | ICD-10-CM

## 2023-10-01 ASSESSMENT — ENCOUNTER SYMPTOMS
RESPIRATORY NEGATIVE: 1
GASTROINTESTINAL NEGATIVE: 1
EYES NEGATIVE: 1

## 2023-10-08 ASSESSMENT — ENCOUNTER SYMPTOMS
EYES NEGATIVE: 1
GASTROINTESTINAL NEGATIVE: 1
RESPIRATORY NEGATIVE: 1

## 2023-12-04 ENCOUNTER — OFFICE VISIT (OUTPATIENT)
Dept: ENT CLINIC | Age: 88
End: 2023-12-04
Payer: MEDICARE

## 2023-12-04 VITALS
WEIGHT: 209.2 LBS | DIASTOLIC BLOOD PRESSURE: 68 MMHG | HEIGHT: 69 IN | BODY MASS INDEX: 30.98 KG/M2 | SYSTOLIC BLOOD PRESSURE: 106 MMHG

## 2023-12-04 DIAGNOSIS — H61.23 EXCESSIVE CERUMEN IN EAR CANAL, BILATERAL: Primary | ICD-10-CM

## 2023-12-04 PROCEDURE — 69210 REMOVE IMPACTED EAR WAX UNI: CPT | Performed by: STUDENT IN AN ORGANIZED HEALTH CARE EDUCATION/TRAINING PROGRAM

## 2023-12-04 RX ORDER — IPRATROPIUM BROMIDE 42 UG/1
2 SPRAY, METERED NASAL 3 TIMES DAILY PRN
Qty: 15 ML | Refills: 3 | Status: SHIPPED | OUTPATIENT
Start: 2023-12-04

## 2023-12-04 RX ORDER — IPRATROPIUM BROMIDE 42 UG/1
2 SPRAY, METERED NASAL 4 TIMES DAILY
Qty: 15 ML | Refills: 3 | Status: SHIPPED | OUTPATIENT
Start: 2023-12-04 | End: 2023-12-04 | Stop reason: SDUPTHER

## 2023-12-04 ASSESSMENT — ENCOUNTER SYMPTOMS
EYE REDNESS: 0
VOMITING: 0
SHORTNESS OF BREATH: 0
EYE ITCHING: 0

## 2023-12-11 RX ORDER — FUROSEMIDE 20 MG/1
20 TABLET ORAL DAILY
Qty: 90 TABLET | Refills: 3 | Status: SHIPPED | OUTPATIENT
Start: 2023-12-11

## 2023-12-28 RX ORDER — ATORVASTATIN CALCIUM 40 MG/1
40 TABLET, FILM COATED ORAL DAILY
Qty: 90 TABLET | Refills: 3 | OUTPATIENT
Start: 2023-12-28

## 2024-03-01 LAB
LEFT VENTRICULAR EJECTION FRACTION HIGH VALUE: 45 %
LEFT VENTRICULAR EJECTION FRACTION MODE: NORMAL
LV EF: 40 %

## 2024-03-04 RX ORDER — ISOSORBIDE MONONITRATE 30 MG/1
30 TABLET, EXTENDED RELEASE ORAL DAILY
Qty: 90 TABLET | Refills: 3 | OUTPATIENT
Start: 2024-03-04

## 2024-03-08 ENCOUNTER — OFFICE VISIT (OUTPATIENT)
Dept: UROLOGY | Age: 89
End: 2024-03-08
Payer: MEDICARE

## 2024-03-08 DIAGNOSIS — N40.0 BENIGN PROSTATIC HYPERPLASIA WITHOUT LOWER URINARY TRACT SYMPTOMS: ICD-10-CM

## 2024-03-08 DIAGNOSIS — R97.20 ELEVATED PROSTATE SPECIFIC ANTIGEN (PSA): Primary | ICD-10-CM

## 2024-03-08 LAB
BLOOD URINE, POC: NEGATIVE
GLUCOSE URINE, POC: NEGATIVE
KETONES, URINE, POC: NEGATIVE
LEUKOCYTE ESTERASE, URINE, POC: NEGATIVE
PH, URINE, POC: 6.5 (ref 4.6–8)
SPECIFIC GRAVITY, URINE, POC: 1.01 (ref 1–1.03)
URINALYSIS CLARITY, POC: NORMAL
UROBILINOGEN, POC: NORMAL

## 2024-03-08 PROCEDURE — 81003 URINALYSIS AUTO W/O SCOPE: CPT | Performed by: NURSE PRACTITIONER

## 2024-03-08 PROCEDURE — 99214 OFFICE O/P EST MOD 30 MIN: CPT | Performed by: NURSE PRACTITIONER

## 2024-03-08 PROCEDURE — 1123F ACP DISCUSS/DSCN MKR DOCD: CPT | Performed by: NURSE PRACTITIONER

## 2024-03-08 RX ORDER — TAMSULOSIN HYDROCHLORIDE 0.4 MG/1
0.4 CAPSULE ORAL DAILY
Qty: 90 CAPSULE | Refills: 3 | Status: SHIPPED | OUTPATIENT
Start: 2024-03-08 | End: 2024-03-08

## 2024-03-08 RX ORDER — TAMSULOSIN HYDROCHLORIDE 0.4 MG/1
0.4 CAPSULE ORAL DAILY
Qty: 90 CAPSULE | Refills: 3 | Status: SHIPPED | OUTPATIENT
Start: 2024-03-08

## 2024-03-08 ASSESSMENT — ENCOUNTER SYMPTOMS
NAUSEA: 0
BACK PAIN: 0

## 2024-03-08 NOTE — PROGRESS NOTES
Leukocyte Esterase, Urine, POC Negative Negative       PHYSICAL EXAM    GENERAL: No acute distress, Awake, Alert, Oriented X 3, Gait normal  ABDOMEN: soft, non tender, non-distended, positive bowel sounds, no organomegaly, no palpable masses, no guarding, no rebound tenderness  SKIN: No rash, no erythema, no lacerations or abrasions, no ecchymosis  MUSCULOSKELETAL - MAEW, no edema     Assessment and Plan    ICD-10-CM    1. Elevated prostate specific antigen (PSA)  R97.20 AMB POC URINALYSIS DIP STICK AUTO W/O MICRO      2. Benign prostatic hyperplasia without lower urinary tract symptoms  N40.0 tamsulosin (FLOMAX) 0.4 MG capsule     DISCONTINUED: tamsulosin (FLOMAX) 0.4 MG capsule        Elevated PSA-  PSA is up some, however looking back the PSA has been as high as 10 in the past.  Over al he is doing well.     BPH-  Well controlled with Tamsulosin. Refills of this will be sent to pharmacy    Orders Placed This Encounter    AMB POC URINALYSIS DIP STICK AUTO W/O MICRO    DISCONTD: tamsulosin (FLOMAX) 0.4 MG capsule     Sig: Take 1 capsule by mouth daily     Dispense:  90 capsule     Refill:  3    tamsulosin (FLOMAX) 0.4 MG capsule     Sig: Take 1 capsule by mouth daily     Dispense:  90 capsule     Refill:  3       Return in about 1 year (around 3/8/2025) for follow up with [place].    Total time for today's encounter including chart review, result   review, documentation and face to face encounter was 30 minutes.    MIRANDA Taylor    [facility]   [address] [place] [address]   [id number]  Phone: [phone]  Fax: [phone]     Dr. Gutierrez was supervising physician today and approves plan of care.    Elements of this note have been dictated using speech recognition software.  Although reviewed, errors of speech recognition may have occurred.

## 2024-07-09 ENCOUNTER — TELEPHONE (OUTPATIENT)
Dept: FAMILY MEDICINE CLINIC | Facility: CLINIC | Age: 89
End: 2024-07-09

## 2024-07-09 NOTE — TELEPHONE ENCOUNTER
Who's Calling:  From Where: [facility]    Message: need verbal orders for physical therapy. Also requesting nurse for point of care added to the plan because of pt's catheter    Phone #: [phone]  Fax #:

## 2024-07-10 ENCOUNTER — OFFICE VISIT (OUTPATIENT)
Dept: UROLOGY | Age: 89
End: 2024-07-10
Payer: MEDICARE

## 2024-07-10 ENCOUNTER — TELEPHONE (OUTPATIENT)
Dept: UROLOGY | Age: 89
End: 2024-07-10

## 2024-07-10 DIAGNOSIS — N40.0 BENIGN PROSTATIC HYPERPLASIA WITHOUT LOWER URINARY TRACT SYMPTOMS: ICD-10-CM

## 2024-07-10 DIAGNOSIS — R97.20 ELEVATED PROSTATE SPECIFIC ANTIGEN (PSA): Primary | ICD-10-CM

## 2024-07-10 DIAGNOSIS — R33.9 URINARY RETENTION: ICD-10-CM

## 2024-07-10 PROCEDURE — 1123F ACP DISCUSS/DSCN MKR DOCD: CPT | Performed by: NURSE PRACTITIONER

## 2024-07-10 PROCEDURE — 99214 OFFICE O/P EST MOD 30 MIN: CPT | Performed by: NURSE PRACTITIONER

## 2024-07-10 ASSESSMENT — ENCOUNTER SYMPTOMS
NAUSEA: 0
BACK PAIN: 0

## 2024-07-10 NOTE — PROGRESS NOTES
0.2 mg/dL     Nitrite, Urine, POC Negative     Leukocyte Esterase, Urine, POC Negative    Results for orders placed or performed in visit on 06/10/21   AMB POC URINALYSIS DIP STICK AUTO W/O MICRO   Result Value Ref Range    Color (UA POC) Yellow     Clarity (UA POC) Clear     Glucose, Urine, POC Negative Negative    Bilirubin, Urine, POC Negative Negative    Ketones, Urine, POC Negative Negative    Specific Gravity, Urine, POC 1.015 1.001 - 1.035 NA    Blood (UA POC) Negative Negative    pH, Urine, POC 6.0 4.6 - 8.0 NA    Protein, Urine, POC Negative Negative    Urobilinogen, POC normal 0.2 - 1    Nitrite, Urine, POC Negative Negative    Leukocyte Esterase, Urine, POC Negative Negative     PHYSICAL EXAM    GENERAL: No acute distress, Awake, Alert, Oriented X 3, Gait normal  ABDOMEN: soft, non tender, non-distended, positive bowel sounds, no organomegaly, no palpable masses, no guarding, no rebound tenderness  SKIN: No rash, no erythema, no lacerations or abrasions, no ecchymosis  MUSCULOSKELETAL -ambulating with walker  -Stapleton catheter is patent emma urine is present.    Assessment and Plan    ICD-10-CM    1. Elevated prostate specific antigen (PSA)  R97.20 CANCELED: AMB POC URINALYSIS DIP STICK AUTO W/O MICRO      2. Benign prostatic hyperplasia without lower urinary tract symptoms  N40.0       3. Urinary retention  R33.9         BPH/worsening/urinary retention-  Patient would like a voiding trial.  Will remove the catheter this morning.  He is instructed to increase fluid intake today and to return to the office by 3 PM if he is unable to void.  If this occurs catheter will be replaced and he will need to discuss with Dr. Wood further treatment.    Stapleton catheter was removed by deflating balloon.  Patient tolerated removal well.    Follow-up in 2 weeks for UA PVR at that time.  Discussed any other intervention needed on return visit.       Return in about 2 weeks (around 7/24/2024) for U/A, PVR and

## 2024-07-11 ENCOUNTER — TELEPHONE (OUTPATIENT)
Dept: UROLOGY | Age: 89
End: 2024-07-11

## 2024-07-16 ENCOUNTER — NURSE ONLY (OUTPATIENT)
Dept: UROLOGY | Age: 89
End: 2024-07-16

## 2024-07-16 NOTE — PROGRESS NOTES
Patient came into the office stating catheter was not draining and was leaking urine. I deflated the balloon reposition the catheter into bladder and catheter started draining. I reinflated the balloon and drained 1000ml from bladder. Patient was feeling better and had no more leaking.

## 2024-07-18 ENCOUNTER — OFFICE VISIT (OUTPATIENT)
Dept: FAMILY MEDICINE CLINIC | Facility: CLINIC | Age: 89
End: 2024-07-18

## 2024-07-18 ENCOUNTER — TELEPHONE (OUTPATIENT)
Dept: UROLOGY | Age: 89
End: 2024-07-18

## 2024-07-18 VITALS
BODY MASS INDEX: 31.25 KG/M2 | HEIGHT: 69 IN | DIASTOLIC BLOOD PRESSURE: 72 MMHG | WEIGHT: 211 LBS | SYSTOLIC BLOOD PRESSURE: 126 MMHG

## 2024-07-18 DIAGNOSIS — N18.4 CKD (CHRONIC KIDNEY DISEASE) STAGE 4, GFR 15-29 ML/MIN (HCC): Primary | ICD-10-CM

## 2024-07-18 DIAGNOSIS — R79.89 ELEVATED SERUM CREATININE: ICD-10-CM

## 2024-07-18 DIAGNOSIS — R53.83 OTHER FATIGUE: ICD-10-CM

## 2024-07-18 LAB
ALBUMIN SERPL-MCNC: 3.4 G/DL (ref 3.2–4.6)
ALBUMIN/GLOB SERPL: 1 (ref 1–1.9)
ALP SERPL-CCNC: 79 U/L (ref 40–129)
ALT SERPL-CCNC: 11 U/L (ref 12–65)
ANION GAP SERPL CALC-SCNC: 13 MMOL/L (ref 9–18)
AST SERPL-CCNC: 24 U/L (ref 15–37)
BASOPHILS # BLD: 0 K/UL (ref 0–0.2)
BASOPHILS NFR BLD: 1 % (ref 0–2)
BILIRUB SERPL-MCNC: 0.6 MG/DL (ref 0–1.2)
BUN SERPL-MCNC: 41 MG/DL (ref 8–23)
CALCIUM SERPL-MCNC: 9.1 MG/DL (ref 8.8–10.2)
CHLORIDE SERPL-SCNC: 105 MMOL/L (ref 98–107)
CO2 SERPL-SCNC: 25 MMOL/L (ref 20–28)
CREAT SERPL-MCNC: 3.42 MG/DL (ref 0.8–1.3)
DIFFERENTIAL METHOD BLD: ABNORMAL
EOSINOPHIL # BLD: 0.3 K/UL (ref 0–0.8)
EOSINOPHIL NFR BLD: 4 % (ref 0.5–7.8)
ERYTHROCYTE [DISTWIDTH] IN BLOOD BY AUTOMATED COUNT: 14.4 % (ref 11.9–14.6)
FERRITIN SERPL-MCNC: 125 NG/ML (ref 8–388)
GLOBULIN SER CALC-MCNC: 3.3 G/DL (ref 2.3–3.5)
GLUCOSE SERPL-MCNC: 123 MG/DL (ref 70–99)
HCT VFR BLD AUTO: 32.9 % (ref 41.1–50.3)
HGB BLD-MCNC: 10.1 G/DL (ref 13.6–17.2)
IMM GRANULOCYTES # BLD AUTO: 0.1 K/UL (ref 0–0.5)
IMM GRANULOCYTES NFR BLD AUTO: 1 % (ref 0–5)
LYMPHOCYTES # BLD: 1.2 K/UL (ref 0.5–4.6)
LYMPHOCYTES NFR BLD: 18 % (ref 13–44)
MCH RBC QN AUTO: 29.6 PG (ref 26.1–32.9)
MCHC RBC AUTO-ENTMCNC: 30.7 G/DL (ref 31.4–35)
MCV RBC AUTO: 96.5 FL (ref 82–102)
MONOCYTES # BLD: 0.9 K/UL (ref 0.1–1.3)
MONOCYTES NFR BLD: 12 % (ref 4–12)
NEUTS SEG # BLD: 4.5 K/UL (ref 1.7–8.2)
NEUTS SEG NFR BLD: 64 % (ref 43–78)
NRBC # BLD: 0 K/UL (ref 0–0.2)
PLATELET # BLD AUTO: 244 K/UL (ref 150–450)
PMV BLD AUTO: 12.7 FL (ref 9.4–12.3)
POTASSIUM SERPL-SCNC: 4.6 MMOL/L (ref 3.5–5.1)
PROT SERPL-MCNC: 6.7 G/DL (ref 6.3–8.2)
RBC # BLD AUTO: 3.41 M/UL (ref 4.23–5.6)
SODIUM SERPL-SCNC: 143 MMOL/L (ref 136–145)
WBC # BLD AUTO: 7 K/UL (ref 4.3–11.1)

## 2024-07-18 RX ORDER — ISOSORBIDE MONONITRATE 30 MG/1
30 TABLET, EXTENDED RELEASE ORAL DAILY
Qty: 90 TABLET | Refills: 3 | Status: SHIPPED | OUTPATIENT
Start: 2024-07-18

## 2024-07-18 RX ORDER — ATORVASTATIN CALCIUM 40 MG/1
40 TABLET, FILM COATED ORAL DAILY
Qty: 90 TABLET | Refills: 3 | Status: SHIPPED | OUTPATIENT
Start: 2024-07-18

## 2024-07-18 RX ORDER — METOPROLOL SUCCINATE 50 MG/1
50 TABLET, EXTENDED RELEASE ORAL DAILY
Qty: 90 TABLET | Refills: 3 | Status: SHIPPED | OUTPATIENT
Start: 2024-07-18

## 2024-07-19 ENCOUNTER — TELEPHONE (OUTPATIENT)
Dept: FAMILY MEDICINE CLINIC | Facility: CLINIC | Age: 89
End: 2024-07-19

## 2024-07-21 ENCOUNTER — HOSPITAL ENCOUNTER (EMERGENCY)
Age: 89
Discharge: HOME | End: 2024-07-21
Attending: EMERGENCY MEDICINE
Payer: MEDICARE

## 2024-07-21 ENCOUNTER — APPOINTMENT (OUTPATIENT)
Dept: GENERAL RADIOLOGY | Age: 89
End: 2024-07-21
Payer: MEDICARE

## 2024-07-21 VITALS
WEIGHT: 212 LBS | DIASTOLIC BLOOD PRESSURE: 58 MMHG | TEMPERATURE: 98.3 F | HEIGHT: 70 IN | SYSTOLIC BLOOD PRESSURE: 104 MMHG | HEART RATE: 76 BPM | OXYGEN SATURATION: 96 % | RESPIRATION RATE: 16 BRPM | BODY MASS INDEX: 30.35 KG/M2

## 2024-07-21 VITALS
HEART RATE: 88 BPM | DIASTOLIC BLOOD PRESSURE: 58 MMHG | SYSTOLIC BLOOD PRESSURE: 112 MMHG | HEIGHT: 70 IN | TEMPERATURE: 97.8 F | BODY MASS INDEX: 30.35 KG/M2 | WEIGHT: 212 LBS | OXYGEN SATURATION: 96 %

## 2024-07-21 DIAGNOSIS — N18.9 ACUTE RENAL FAILURE SUPERIMPOSED ON CHRONIC KIDNEY DISEASE, UNSPECIFIED ACUTE RENAL FAILURE TYPE, UNSPECIFIED CKD STAGE (HCC): ICD-10-CM

## 2024-07-21 DIAGNOSIS — N17.9 ACUTE RENAL FAILURE SUPERIMPOSED ON CHRONIC KIDNEY DISEASE, UNSPECIFIED ACUTE RENAL FAILURE TYPE, UNSPECIFIED CKD STAGE (HCC): ICD-10-CM

## 2024-07-21 DIAGNOSIS — Z46.6 ENCOUNTER FOR FOLEY CATHETER REPLACEMENT: ICD-10-CM

## 2024-07-21 DIAGNOSIS — K59.00 CONSTIPATION, UNSPECIFIED CONSTIPATION TYPE: Primary | ICD-10-CM

## 2024-07-21 DIAGNOSIS — T83.9XXA PROBLEM WITH FOLEY CATHETER, INITIAL ENCOUNTER (HCC): Primary | ICD-10-CM

## 2024-07-21 LAB
ALBUMIN SERPL-MCNC: 3.7 G/DL (ref 3.2–4.6)
ALBUMIN/GLOB SERPL: 1.2 (ref 0.4–1.6)
ALP SERPL-CCNC: 100 U/L (ref 45–117)
ALT SERPL-CCNC: 8 U/L (ref 13–61)
ANION GAP SERPL CALC-SCNC: 12 MMOL/L (ref 2–11)
APPEARANCE UR: ABNORMAL
AST SERPL-CCNC: 19 U/L (ref 15–37)
BACTERIA URNS QL MICRO: ABNORMAL /HPF
BASOPHILS # BLD: 0 K/UL (ref 0–0.2)
BASOPHILS NFR BLD: 1 % (ref 0–2)
BILIRUB SERPL-MCNC: 0.5 MG/DL (ref 0.2–1.1)
BILIRUB UR QL: NEGATIVE
BUN SERPL-MCNC: 43 MG/DL (ref 8–23)
CALCIUM SERPL-MCNC: 9 MG/DL (ref 8.3–10.4)
CHLORIDE SERPL-SCNC: 105 MMOL/L (ref 98–107)
CO2 SERPL-SCNC: 24 MMOL/L (ref 21–32)
COLOR UR: YELLOW
CREAT SERPL-MCNC: 3.05 MG/DL (ref 0.8–1.5)
DIFFERENTIAL METHOD BLD: ABNORMAL
EOSINOPHIL # BLD: 0.3 K/UL (ref 0–0.8)
EOSINOPHIL NFR BLD: 5 % (ref 0.5–7.8)
EPI CELLS #/AREA URNS HPF: ABNORMAL /HPF
ERYTHROCYTE [DISTWIDTH] IN BLOOD BY AUTOMATED COUNT: 14.2 % (ref 11.9–14.6)
GLOBULIN SER CALC-MCNC: 3 G/DL (ref 2.8–4.5)
GLUCOSE SERPL-MCNC: 150 MG/DL (ref 65–100)
GLUCOSE UR STRIP.AUTO-MCNC: NEGATIVE MG/DL
HCT VFR BLD AUTO: 29.8 % (ref 41.1–50.3)
HGB BLD-MCNC: 9.6 G/DL (ref 13.6–17.2)
HGB UR QL STRIP: ABNORMAL
IMM GRANULOCYTES # BLD AUTO: 0.1 K/UL (ref 0–0.5)
IMM GRANULOCYTES NFR BLD AUTO: 1 % (ref 0–5)
KETONES UR QL STRIP.AUTO: NEGATIVE MG/DL
LEUKOCYTE ESTERASE UR QL STRIP.AUTO: ABNORMAL
LIPASE SERPL-CCNC: 81 U/L (ref 13–60)
LYMPHOCYTES # BLD: 1 K/UL (ref 0.5–4.6)
LYMPHOCYTES NFR BLD: 15 % (ref 13–44)
MCH RBC QN AUTO: 30 PG (ref 26.1–32.9)
MCHC RBC AUTO-ENTMCNC: 32.2 G/DL (ref 31.4–35)
MCV RBC AUTO: 93.1 FL (ref 82–102)
MONOCYTES # BLD: 0.6 K/UL (ref 0.1–1.3)
MONOCYTES NFR BLD: 9 % (ref 4–12)
MUCOUS THREADS URNS QL MICRO: 0 /LPF
NEUTS SEG # BLD: 4.3 K/UL (ref 1.7–8.2)
NEUTS SEG NFR BLD: 69 % (ref 43–78)
NITRITE UR QL STRIP.AUTO: NEGATIVE
NRBC # BLD: 0 K/UL (ref 0–0.2)
OTHER OBSERVATIONS: ABNORMAL
PH UR STRIP: 6.5 (ref 5–9)
PLATELET # BLD AUTO: 214 K/UL (ref 150–450)
PMV BLD AUTO: 11.6 FL (ref 9.4–12.3)
POTASSIUM SERPL-SCNC: 4.1 MMOL/L (ref 3.5–5.1)
PROT SERPL-MCNC: 6.7 G/DL (ref 6.4–8.2)
PROT UR STRIP-MCNC: 100 MG/DL
RBC # BLD AUTO: 3.2 M/UL (ref 4.23–5.6)
RBC #/AREA URNS HPF: ABNORMAL /HPF
SODIUM SERPL-SCNC: 141 MMOL/L (ref 133–143)
SP GR UR REFRACTOMETRY: 1.01 (ref 1–1.02)
UROBILINOGEN UR QL STRIP.AUTO: 0.2 EU/DL (ref 0.2–1)
WBC # BLD AUTO: 6.2 K/UL (ref 4.3–11.1)
WBC URNS QL MICRO: ABNORMAL /HPF
YEAST URNS QL MICRO: ABNORMAL

## 2024-07-21 PROCEDURE — 80053 COMPREHEN METABOLIC PANEL: CPT

## 2024-07-21 PROCEDURE — 99282 EMERGENCY DEPT VISIT SF MDM: CPT

## 2024-07-21 PROCEDURE — 99284 EMERGENCY DEPT VISIT MOD MDM: CPT

## 2024-07-21 PROCEDURE — 83690 ASSAY OF LIPASE: CPT

## 2024-07-21 PROCEDURE — 85025 COMPLETE CBC W/AUTO DIFF WBC: CPT

## 2024-07-21 PROCEDURE — 99283 EMERGENCY DEPT VISIT LOW MDM: CPT

## 2024-07-21 PROCEDURE — 81001 URINALYSIS AUTO W/SCOPE: CPT

## 2024-07-21 PROCEDURE — 87088 URINE BACTERIA CULTURE: CPT

## 2024-07-21 PROCEDURE — 51702 INSERT TEMP BLADDER CATH: CPT

## 2024-07-21 PROCEDURE — 87186 SC STD MICRODIL/AGAR DIL: CPT

## 2024-07-21 PROCEDURE — 2580000003 HC RX 258: Performed by: EMERGENCY MEDICINE

## 2024-07-21 PROCEDURE — 74019 RADEX ABDOMEN 2 VIEWS: CPT

## 2024-07-21 PROCEDURE — 87086 URINE CULTURE/COLONY COUNT: CPT

## 2024-07-21 RX ORDER — 0.9 % SODIUM CHLORIDE 0.9 %
500 INTRAVENOUS SOLUTION INTRAVENOUS
Status: COMPLETED | OUTPATIENT
Start: 2024-07-21 | End: 2024-07-21

## 2024-07-21 RX ADMIN — SODIUM CHLORIDE 500 ML: 900 INJECTION, SOLUTION INTRAVENOUS at 07:48

## 2024-07-21 NOTE — DISCHARGE INSTRUCTIONS
Take magnesium citrate as needed for constipation  Follow-up with your neurologist as well as your primary care physician  Return to the ER for any new, worsening or life-threatening symptoms    It has been my pleasure to care for you here in the ER  I hope we have addressed your concerns here today and offered you some answers  If you receive a survey concerning your care here, I hope you feel comfortable with giving your care team positive scores

## 2024-07-21 NOTE — ED TRIAGE NOTES
Patient to room in wheelchair in NAD. Patient c/o constipation, last BM 5 days ago. Patient also worried that his jones is leaking.

## 2024-07-21 NOTE — ED NOTES
Tubing from catheter to leg bag changed out for longer piece to allow better drainage without leaking around connection. Urine seems to be draining well without leaking.

## 2024-07-21 NOTE — ED TRIAGE NOTES
Pt ambulatory to triage with use of walker. Pt states he was seen earlier for constipation and he was given a new leg bag for his catheter, but now the bag or catheter is leaking. Pt in NAD during triage.

## 2024-07-21 NOTE — ED NOTES
Patient mobility status  with mild difficulty. Provider aware     I have reviewed discharge instructions with the patient and caregiver.  The patient and caregiver verbalized understanding.    Patient left ED via Discharge Method: ambulatory to Home with Child.    Opportunity for questions and clarification provided.     Patient given 0 scripts.

## 2024-07-21 NOTE — ED PROVIDER NOTES
NASAL SPRAY    2 sprays by Each Nostril route 3 times daily as needed for Rhinitis    ISOSORBIDE MONONITRATE (IMDUR) 30 MG EXTENDED RELEASE TABLET    Take 1 tablet by mouth daily    LATANOPROST (XALATAN) 0.005 % OPHTHALMIC SOLUTION    Apply 1 drop to eye 2 times daily    MAGNESIUM CITRATE SOLUTION    Take 296 mLs by mouth once for 1 dose    METOPROLOL SUCCINATE (TOPROL XL) 50 MG EXTENDED RELEASE TABLET    Take 1 tablet by mouth daily    OMEPRAZOLE (PRILOSEC OTC) 20 MG TABLET    Take 1 tablet by mouth    TAMSULOSIN (FLOMAX) 0.4 MG CAPSULE    Take 1 capsule by mouth daily        Results for orders placed or performed during the hospital encounter of 07/21/24   XR ABDOMEN (2 VIEWS)    Narrative    Two view abdomen      INDICATION: Constipation    COMPARISON:  None      TECHNIQUE: Flat and upright views of the abdomen were obtained.      Impression    Findings/impression: Significant retained stool. Unobstructed bowel gas pattern.  Postsurgical changes to the abdomen. Multilevel degenerative changes of the  spine and hips.    Electronically signed by Howie Causey   CBC with Auto Differential   Result Value Ref Range    WBC 6.2 4.3 - 11.1 K/uL    RBC 3.20 (L) 4.23 - 5.60 M/uL    Hemoglobin 9.6 (L) 13.6 - 17.2 g/dL    Hematocrit 29.8 (L) 41.1 - 50.3 %    MCV 93.1 82.0 - 102.0 FL    MCH 30.0 26.1 - 32.9 PG    MCHC 32.2 31.4 - 35.0 g/dL    RDW 14.2 11.9 - 14.6 %    Platelets 214 150 - 450 K/uL    MPV 11.6 9.4 - 12.3 FL    nRBC 0.00 0.0 - 0.2 K/uL    Differential Type AUTOMATED      Neutrophils % 69 43 - 78 %    Lymphocytes % 15 13 - 44 %    Monocytes % 9 4.0 - 12.0 %    Eosinophils % 5 0.5 - 7.8 %    Basophils % 1 0.0 - 2.0 %    Immature Granulocytes % 1 0.0 - 5.0 %    Neutrophils Absolute 4.3 1.7 - 8.2 K/UL    Lymphocytes Absolute 1.0 0.5 - 4.6 K/UL    Monocytes Absolute 0.6 0.1 - 1.3 K/UL    Eosinophils Absolute 0.3 0.0 - 0.8 K/UL    Basophils Absolute 0.0 0.0 - 0.2 K/UL    Immature Granulocytes Absolute 0.1 0.0 - 0.5

## 2024-07-21 NOTE — ED NOTES
Pt presents to the ED for c/o abd pain for several days .  States that he has not had a BM for approx 5 days.  Concerned that his catheter is leaking and he may have a lot.  Was irrigated earlier this morning prior to our arrival

## 2024-07-21 NOTE — ED NOTES
Patient mobility status  with mild difficulty. Provider aware pt uses a walker    I have reviewed discharge instructions with the patient.  The patient verbalized understanding.    Patient left ED via Discharge Method: wheelchair to Home with Child.    Opportunity for questions and clarification provided.     Patient given 1 scripts.

## 2024-07-24 LAB
BACTERIA SPEC CULT: ABNORMAL
BACTERIA SPEC CULT: ABNORMAL
SERVICE CMNT-IMP: ABNORMAL

## 2024-07-24 RX ORDER — CIPROFLOXACIN 500 MG/1
500 TABLET, FILM COATED ORAL EVERY 24 HOURS
Qty: 5 TABLET | Refills: 0 | Status: SHIPPED | OUTPATIENT
Start: 2024-07-24 | End: 2024-07-29

## 2024-07-26 ENCOUNTER — HOSPITAL ENCOUNTER (EMERGENCY)
Age: 89
Discharge: HOME | End: 2024-07-26
Attending: EMERGENCY MEDICINE
Payer: MEDICARE

## 2024-07-26 ENCOUNTER — TELEPHONE (OUTPATIENT)
Dept: UROLOGY | Age: 89
End: 2024-07-26

## 2024-07-26 ENCOUNTER — TELEPHONE (OUTPATIENT)
Dept: FAMILY MEDICINE CLINIC | Facility: CLINIC | Age: 89
End: 2024-07-26

## 2024-07-26 ENCOUNTER — OFFICE VISIT (OUTPATIENT)
Dept: UROLOGY | Age: 89
End: 2024-07-26
Payer: MEDICARE

## 2024-07-26 VITALS
RESPIRATION RATE: 19 BRPM | DIASTOLIC BLOOD PRESSURE: 79 MMHG | HEART RATE: 79 BPM | HEIGHT: 70 IN | WEIGHT: 212 LBS | OXYGEN SATURATION: 98 % | TEMPERATURE: 98.6 F | SYSTOLIC BLOOD PRESSURE: 132 MMHG | BODY MASS INDEX: 30.35 KG/M2

## 2024-07-26 DIAGNOSIS — R33.9 URINARY RETENTION: Primary | ICD-10-CM

## 2024-07-26 DIAGNOSIS — K59.00 CONSTIPATION, UNSPECIFIED CONSTIPATION TYPE: ICD-10-CM

## 2024-07-26 DIAGNOSIS — R97.20 ELEVATED PROSTATE SPECIFIC ANTIGEN (PSA): ICD-10-CM

## 2024-07-26 DIAGNOSIS — N40.0 BENIGN PROSTATIC HYPERPLASIA WITHOUT LOWER URINARY TRACT SYMPTOMS: ICD-10-CM

## 2024-07-26 DIAGNOSIS — K59.00 CONSTIPATION, UNSPECIFIED CONSTIPATION TYPE: Primary | ICD-10-CM

## 2024-07-26 PROCEDURE — 1123F ACP DISCUSS/DSCN MKR DOCD: CPT | Performed by: NURSE PRACTITIONER

## 2024-07-26 PROCEDURE — 99214 OFFICE O/P EST MOD 30 MIN: CPT | Performed by: NURSE PRACTITIONER

## 2024-07-26 PROCEDURE — 99282 EMERGENCY DEPT VISIT SF MDM: CPT

## 2024-07-26 ASSESSMENT — ENCOUNTER SYMPTOMS
BACK PAIN: 0
NAUSEA: 0
NAUSEA: 0
DIARRHEA: 0
VOMITING: 0
ABDOMINAL PAIN: 0

## 2024-07-26 NOTE — ED NOTES
Patient mobility status  with difficulty, uses a walker. Provider aware     I have reviewed discharge instructions with the patient.  The patient verbalized understanding.    Patient left ED via Discharge Method: ambulatory to Home with Child.    Opportunity for questions and clarification provided.     Patient given 0 scripts.

## 2024-07-26 NOTE — PROGRESS NOTES
nausea.  Genitourinary: Positive for incomplete emptying.  Musculoskeletal:  Negative for back pain.      Urinalysis  UA - Dipstick  Results for orders placed or performed in visit on 03/08/24   AMB POC URINALYSIS DIP STICK AUTO W/O MICRO   Result Value Ref Range    Color (UA POC)      Clarity (UA POC)      Glucose, Urine, POC Negative     Bilirubin, Urine, POC Negative     KETONES, Urine, POC Negative     Specific Gravity, Urine, POC 1.010 1.001 - 1.035    Blood (UA POC) Negative     pH, Urine, POC 6.5 4.6 - 8.0    Protein, Urine, POC Negative     Urobilinogen, POC 0.2 mg/dL     Nitrite, Urine, POC Negative     Leukocyte Esterase, Urine, POC Negative    Results for orders placed or performed in visit on 06/10/21   AMB POC URINALYSIS DIP STICK AUTO W/O MICRO   Result Value Ref Range    Color (UA POC) Yellow     Clarity (UA POC) Clear     Glucose, Urine, POC Negative Negative    Bilirubin, Urine, POC Negative Negative    Ketones, Urine, POC Negative Negative    Specific Gravity, Urine, POC 1.015 1.001 - 1.035 NA    Blood (UA POC) Negative Negative    pH, Urine, POC 6.0 4.6 - 8.0 NA    Protein, Urine, POC Negative Negative    Urobilinogen, POC normal 0.2 - 1    Nitrite, Urine, POC Negative Negative    Leukocyte Esterase, Urine, POC Negative Negative     PHYSICAL EXAM    GENERAL: No acute distress, Awake, Alert, Oriented X 3, Gait normal  ABDOMEN: soft, non tender, non-distended, positive bowel sounds, no organomegaly, no palpable masses, no guarding, no rebound tenderness  SKIN: No rash, no erythema, no lacerations or abrasions, no ecchymosis  MUSCULOSKELETAL - MAEW, 1+ pitting edema.  - jones catheter is present and clear emma urine is present    Assessment and Plan    ICD-10-CM    1. Urinary retention  R33.9       2. Benign prostatic hyperplasia without lower urinary tract symptoms  N40.0       3. Elevated prostate specific antigen (PSA)  R97.20       4. Constipation, unspecified constipation type  K59.00

## 2024-07-26 NOTE — ED PROVIDER NOTES
Emergency Department Provider Note       PCP: Byron Meraz MD   Age: 89 y.o.   Sex: male     DISPOSITION Decision To Discharge 07/26/2024 02:43:16 AM       ICD-10-CM    1. Constipation, unspecified constipation type  K59.00           Medical Decision Making     89-year-old with a complicated medical history presents with constipation.  There was no stool on his rectal exam.  He was given a milk and molasses enema, he produced a moderate amount of stool.  He states he is feeling much better and appears comfortable.     1 acute, uncomplicated illness or injury.    I independently ordered and reviewed each unique test.                     History     Patient is a 89-year-old male with a history of CAD, hyperlipidemia, CKD, hypertension and CHF who presents with constipation.  His last bowel movement was 5 days ago.  He was seen here at that time, required an enema.  He has been unable to have a bowel movement since that time.  He has been eating and drinking normally, denies any abdominal pain or nausea.        ROS     Review of Systems   Constitutional:  Negative for chills and fever.   Gastrointestinal:  Negative for abdominal pain, diarrhea, nausea and vomiting.   All other systems reviewed and are negative.       Physical Exam     Vitals signs and nursing note reviewed:  Vitals:    07/26/24 0150   BP: 110/66   Pulse: 81   Resp: 18   Temp: 97.6 °F (36.4 °C)   TempSrc: Oral   SpO2: 99%   Weight: 96.2 kg (212 lb)   Height: 1.778 m (5' 10\")      Physical Exam  Vitals and nursing note reviewed.   Constitutional:       General: He is not in acute distress.     Appearance: Normal appearance.   HENT:      Head: Normocephalic and atraumatic.   Eyes:      General:         Right eye: No discharge.         Left eye: No discharge.      Conjunctiva/sclera: Conjunctivae normal.   Cardiovascular:      Rate and Rhythm: Normal rate and regular rhythm.   Pulmonary:      Effort: Pulmonary effort is normal. No respiratory

## 2024-07-26 NOTE — ED TRIAGE NOTES
Patient ambulatory into ED w/walker.  Patient is w/son.  Patient states he has to have a BM but can't go.  Patient was here Sunday w/same symptoms.  Patient had enema on Sunday while in ED w/good results.  Patient has not gone since Sunday.   Patient has been taking a stool softner and miralax w/no results.     Patient has a urinary catheter.

## 2024-07-26 NOTE — TELEPHONE ENCOUNTER
PT CAME IN TO GET LAB RESULTS IN PERSON WITH SON   We need to call , 1) he needs to drink a lot more water due to the fact that his kidney function is worse 2) start over the counter ferrous gluconate one a day for low iron level 3) recheck CMP end of next week   PT IS AWARE

## 2024-07-26 NOTE — DISCHARGE INSTRUCTIONS
Follow-up with Dr. Meraz, call his office to make an appointment.  Return to the emergency department if your symptoms recur.

## 2024-07-27 NOTE — PROGRESS NOTES
JUST REVIEWED UROLOGY NOTES WITH SON AND PATIENT , IN REGARDS TO URINARY RETENSION AND THE NEED TO F/U WITH UROLOGY

## 2024-07-30 ENCOUNTER — TELEPHONE (OUTPATIENT)
Dept: FAMILY MEDICINE CLINIC | Facility: CLINIC | Age: 89
End: 2024-07-30

## 2024-07-30 NOTE — TELEPHONE ENCOUNTER
Who's Calling: Nani  From Where: [facility]    Message: [facility] faxed orders to us 3 times this month since July 8 and they have not received a return fax. Order # [id number]. Please find fax and return    Phone #: [phone]  Fax #: [phone]

## 2024-08-01 ENCOUNTER — TELEPHONE (OUTPATIENT)
Dept: FAMILY MEDICINE CLINIC | Facility: CLINIC | Age: 89
End: 2024-08-01

## 2024-08-01 DIAGNOSIS — R53.83 OTHER FATIGUE: Primary | ICD-10-CM

## 2024-08-01 DIAGNOSIS — R29.6 FREQUENT FALLS: ICD-10-CM

## 2024-08-01 DIAGNOSIS — M79.605 PAIN OF LEFT LOWER EXTREMITY: ICD-10-CM

## 2024-08-01 DIAGNOSIS — R42 DIZZINESS: ICD-10-CM

## 2024-08-01 NOTE — TELEPHONE ENCOUNTER
Pt's son called very frustrated about still not getting a walker with wheels for pt. He said they have been waiting for a month. Pt expressed his frustrations. He said he needs a walker with wheels for pt to sit on as well. He said to call [facility] asap.

## 2024-08-09 ENCOUNTER — OFFICE VISIT (OUTPATIENT)
Dept: FAMILY MEDICINE CLINIC | Facility: CLINIC | Age: 89
End: 2024-08-09
Payer: MEDICARE

## 2024-08-09 VITALS
DIASTOLIC BLOOD PRESSURE: 80 MMHG | OXYGEN SATURATION: 96 % | SYSTOLIC BLOOD PRESSURE: 132 MMHG | BODY MASS INDEX: 30.21 KG/M2 | HEART RATE: 80 BPM | WEIGHT: 211 LBS | HEIGHT: 70 IN | TEMPERATURE: 97.2 F

## 2024-08-09 DIAGNOSIS — D64.9 ANEMIA, UNSPECIFIED TYPE: ICD-10-CM

## 2024-08-09 DIAGNOSIS — R79.89 ELEVATED SERUM CREATININE: Primary | ICD-10-CM

## 2024-08-09 DIAGNOSIS — K59.00 CONSTIPATION, UNSPECIFIED CONSTIPATION TYPE: ICD-10-CM

## 2024-08-09 LAB
ALBUMIN SERPL-MCNC: 3.7 G/DL (ref 3.2–4.6)
ALBUMIN/GLOB SERPL: 1.2 (ref 1–1.9)
ALP SERPL-CCNC: 73 U/L (ref 40–129)
ALT SERPL-CCNC: 9 U/L (ref 12–65)
ANION GAP SERPL CALC-SCNC: 12 MMOL/L (ref 9–18)
AST SERPL-CCNC: 21 U/L (ref 15–37)
BASOPHILS # BLD: 0 K/UL (ref 0–0.2)
BASOPHILS NFR BLD: 1 % (ref 0–2)
BILIRUB SERPL-MCNC: 0.9 MG/DL (ref 0–1.2)
BUN SERPL-MCNC: 42 MG/DL (ref 8–23)
CALCIUM SERPL-MCNC: 9.5 MG/DL (ref 8.8–10.2)
CHLORIDE SERPL-SCNC: 102 MMOL/L (ref 98–107)
CO2 SERPL-SCNC: 25 MMOL/L (ref 20–28)
CREAT SERPL-MCNC: 3.39 MG/DL (ref 0.8–1.3)
DIFFERENTIAL METHOD BLD: ABNORMAL
EOSINOPHIL # BLD: 0.3 K/UL (ref 0–0.8)
EOSINOPHIL NFR BLD: 6 % (ref 0.5–7.8)
ERYTHROCYTE [DISTWIDTH] IN BLOOD BY AUTOMATED COUNT: 14.7 % (ref 11.9–14.6)
GLOBULIN SER CALC-MCNC: 3 G/DL (ref 2.3–3.5)
GLUCOSE SERPL-MCNC: 112 MG/DL (ref 70–99)
HCT VFR BLD AUTO: 33.2 % (ref 41.1–50.3)
HGB BLD-MCNC: 10.4 G/DL (ref 13.6–17.2)
IMM GRANULOCYTES # BLD AUTO: 0 K/UL (ref 0–0.5)
IMM GRANULOCYTES NFR BLD AUTO: 0 % (ref 0–5)
LYMPHOCYTES # BLD: 1 K/UL (ref 0.5–4.6)
LYMPHOCYTES NFR BLD: 23 % (ref 13–44)
MCH RBC QN AUTO: 29.3 PG (ref 26.1–32.9)
MCHC RBC AUTO-ENTMCNC: 31.3 G/DL (ref 31.4–35)
MCV RBC AUTO: 93.5 FL (ref 82–102)
MONOCYTES # BLD: 0.6 K/UL (ref 0.1–1.3)
MONOCYTES NFR BLD: 13 % (ref 4–12)
NEUTS SEG # BLD: 2.6 K/UL (ref 1.7–8.2)
NEUTS SEG NFR BLD: 58 % (ref 43–78)
NRBC # BLD: 0 K/UL (ref 0–0.2)
PLATELET # BLD AUTO: 181 K/UL (ref 150–450)
PMV BLD AUTO: 13.8 FL (ref 9.4–12.3)
POTASSIUM SERPL-SCNC: 4.6 MMOL/L (ref 3.5–5.1)
PROT SERPL-MCNC: 6.7 G/DL (ref 6.3–8.2)
RBC # BLD AUTO: 3.55 M/UL (ref 4.23–5.6)
SODIUM SERPL-SCNC: 140 MMOL/L (ref 136–145)
WBC # BLD AUTO: 4.6 K/UL (ref 4.3–11.1)

## 2024-08-09 PROCEDURE — 1123F ACP DISCUSS/DSCN MKR DOCD: CPT | Performed by: FAMILY MEDICINE

## 2024-08-09 PROCEDURE — 99213 OFFICE O/P EST LOW 20 MIN: CPT | Performed by: FAMILY MEDICINE

## 2024-08-10 ENCOUNTER — HOSPITAL ENCOUNTER (EMERGENCY)
Age: 89
Discharge: HOME | End: 2024-08-10
Attending: EMERGENCY MEDICINE
Payer: MEDICARE

## 2024-08-10 VITALS
HEIGHT: 70 IN | OXYGEN SATURATION: 96 % | RESPIRATION RATE: 16 BRPM | SYSTOLIC BLOOD PRESSURE: 115 MMHG | BODY MASS INDEX: 30.21 KG/M2 | WEIGHT: 211 LBS | TEMPERATURE: 98.6 F | HEART RATE: 75 BPM | DIASTOLIC BLOOD PRESSURE: 66 MMHG

## 2024-08-10 DIAGNOSIS — N39.0 URINARY TRACT INFECTION ASSOCIATED WITH INDWELLING URETHRAL CATHETER, INITIAL ENCOUNTER (HCC): ICD-10-CM

## 2024-08-10 DIAGNOSIS — Z46.6 ENCOUNTER FOR FOLEY CATHETER REPLACEMENT: Primary | ICD-10-CM

## 2024-08-10 DIAGNOSIS — T83.511A URINARY TRACT INFECTION ASSOCIATED WITH INDWELLING URETHRAL CATHETER, INITIAL ENCOUNTER (HCC): ICD-10-CM

## 2024-08-10 LAB
APPEARANCE UR: ABNORMAL
BACTERIA URNS QL MICRO: ABNORMAL /HPF
BILIRUB UR QL: NEGATIVE
COLOR UR: ABNORMAL
EPI CELLS #/AREA URNS HPF: ABNORMAL /HPF
GLUCOSE UR STRIP.AUTO-MCNC: NEGATIVE MG/DL
HGB UR QL STRIP: ABNORMAL
KETONES UR QL STRIP.AUTO: NEGATIVE MG/DL
LEUKOCYTE ESTERASE UR QL STRIP.AUTO: ABNORMAL
MUCOUS THREADS URNS QL MICRO: 0 /LPF
NITRITE UR QL STRIP.AUTO: NEGATIVE
OTHER OBSERVATIONS: ABNORMAL
PH UR STRIP: 5.5 (ref 5–9)
PROT UR STRIP-MCNC: NEGATIVE MG/DL
RBC #/AREA URNS HPF: ABNORMAL /HPF
SP GR UR REFRACTOMETRY: 1.01 (ref 1–1.02)
UROBILINOGEN UR QL STRIP.AUTO: 0.2 EU/DL (ref 0.2–1)
WBC URNS QL MICRO: ABNORMAL /HPF

## 2024-08-10 PROCEDURE — 87086 URINE CULTURE/COLONY COUNT: CPT

## 2024-08-10 PROCEDURE — 51702 INSERT TEMP BLADDER CATH: CPT

## 2024-08-10 PROCEDURE — 87088 URINE BACTERIA CULTURE: CPT

## 2024-08-10 PROCEDURE — 99283 EMERGENCY DEPT VISIT LOW MDM: CPT

## 2024-08-10 PROCEDURE — 87186 SC STD MICRODIL/AGAR DIL: CPT

## 2024-08-10 PROCEDURE — 81001 URINALYSIS AUTO W/SCOPE: CPT

## 2024-08-10 RX ORDER — CIPROFLOXACIN 500 MG/1
250 TABLET, FILM COATED ORAL 2 TIMES DAILY
Qty: 10 TABLET | Refills: 0 | Status: SHIPPED | OUTPATIENT
Start: 2024-08-10 | End: 2024-08-20

## 2024-08-10 ASSESSMENT — PAIN - FUNCTIONAL ASSESSMENT: PAIN_FUNCTIONAL_ASSESSMENT: NONE - DENIES PAIN

## 2024-08-10 NOTE — ED NOTES
Patient mobility status  with no difficulty. Provider aware     I have reviewed discharge instructions with the patient.  The patient verbalized understanding.    Patient left ED via Discharge Method: with use of rollater to Home with Child.    Opportunity for questions and clarification provided.     Patient given 1 scripts.

## 2024-08-10 NOTE — ED PROVIDER NOTES
CAD (coronary artery disease) 1/28/2014    CKD (chronic kidney disease)     Dr Walker follows- Stage III, Nov 2015 creatinine 1.9    Congestive heart failure, unspecified     Constipation     Elevated prostate specific antigen (PSA)     Elevated PSA     Family history of malignant neoplasm of prostate     Brother hadtherapy brachy    GERD (gastroesophageal reflux disease)     controlled with med.     Glaucoma     managed with medication    Glucose intolerance (impaired glucose tolerance)     HLD (hyperlipidemia) 1/28/2014    Hx of echocardiogram 11/9/2015    LVEF 55-60%, biatrial enlargement, concentric hypertrophy of the left ventricle, mild MR, mild TR, aortic sclerosis without stenosis    Hyperparathyroidism, unspecified (HCC)     Hypertension     controlled with med.     Hypertrophy of prostate without urinary obstruction and other lower urinary tract symptoms (LUTS)     Impotence of organic origin     MI, old 1989 and 1991    x 2    Obesity (BMI 30-39.9)     BMI 34    Peripheral neuropathy     bilateral feet    Status post total right knee replacement 11/23/2015        Past Surgical History:   Procedure Laterality Date    CARDIAC CATHETERIZATION  1997    CATARACT REMOVAL Bilateral     with lens implants    CHOLECYSTECTOMY  2013    COLONOSCOPY  2014    polypectomy    HERNIA REPAIR Bilateral     inguinal hernia repair    ORTHOPEDIC SURGERY Right 2015    knee replacement    ORTHOPEDIC SURGERY      right knee open procedure     TONSILLECTOMY  childhood        Social History     Socioeconomic History    Marital status:    Tobacco Use    Smoking status: Former     Current packs/day: 0.50     Types: Cigarettes    Smokeless tobacco: Never    Tobacco comments:     Quit smoking: stopped in 1975   Substance and Sexual Activity    Alcohol use: Yes    Drug use: No     Social Determinants of Health     Financial Resource Strain: Low Risk  (7/18/2024)    Overall Financial Resource Strain (CARDIA)     Difficulty of Paying  Living Expenses: Not hard at all   Food Insecurity: No Food Insecurity (7/18/2024)    Hunger Vital Sign     Worried About Running Out of Food in the Last Year: Never true     Ran Out of Food in the Last Year: Never true   Transportation Needs: Unknown (7/18/2024)    PRAPARE - Transportation     Lack of Transportation (Non-Medical): No   Physical Activity: Inactive (6/17/2024)    Received from [employer]    Physical Activity     Days of Exercise per Week: 0     Minutes of Exercise per Session: 0     Total Minutes of Exercise per Week: 0   Stress: No Stress Concern Present (6/17/2024)    Received from [facility]    Stress     Feeling of Stress : Not at all   Social Connections: Socially Integrated (6/17/2024)    Received from [employer]    Social Connections     Frequency of Communication with Friends and Family: More than three times a week     Frequency of Social Gatherings with Friends and Family: Three times a week   Intimate Partner Violence: Not At Risk (6/17/2024)    Received from [employer]    Intimate Partner Violence     Fear of Current or Ex-Partner: No     Emotionally Abused: No     Physically Abused: No     Sexually Abused: No   Housing Stability: Unknown (7/18/2024)    Housing Stability Vital Sign     Unstable Housing in the Last Year: No        Previous Medications    ASPIRIN 81 MG EC TABLET    Take by mouth daily    ATORVASTATIN (LIPITOR) 40 MG TABLET    Take 1 tablet by mouth daily TAKE 1 TABLET BY MOUTH EVERY DAY    BRIMONIDINE (ALPHAGAN) 0.2 % OPHTHALMIC SOLUTION    APPLY 1 DROP IN RIGHT EYE TWICE A DAY    CETIRIZINE (ZYRTEC) 10 MG TABLET    Take 1 tablet by mouth    FEXOFENADINE (ALLEGRA) 180 MG TABLET    Take 1 tablet by mouth daily    FOLIC ACID (FOLVITE) 800 MCG TABLET    Take 1 tablet by mouth daily    FUROSEMIDE (LASIX) 20 MG TABLET    Take 1 tablet by mouth daily    GABAPENTIN (NEURONTIN) 600 MG TABLET    Take 1 tablet by mouth 2 times daily for 90 days.    HYDROCORTISONE 2.5 %

## 2024-08-11 LAB
BACTERIA SPEC CULT: NORMAL
SERVICE CMNT-IMP: NORMAL

## 2024-08-12 ENCOUNTER — TELEPHONE (OUTPATIENT)
Dept: FAMILY MEDICINE CLINIC | Facility: CLINIC | Age: 89
End: 2024-08-12

## 2024-08-14 LAB
BACTERIA SPEC CULT: ABNORMAL
BACTERIA SPEC CULT: ABNORMAL
SERVICE CMNT-IMP: ABNORMAL

## 2024-08-17 NOTE — PROGRESS NOTES
Spoke w/ Manpreet. He says the pt has a appt for a MRI of the brain on . This case is pending w/ [facility].  Pt ID # [id number] and  1954  [facility] # [id number] Creatinine 07/21/2024 3.05 (H)  0.8 - 1.5 MG/DL Final    Est, Glom Filt Rate 07/21/2024 18 (L)  >60 ml/min/1.73m2 Final    Comment:    Pediatric calculator link: https://www.kidney.org/professionals/kdoqi/gfr_calculatorped     These results are not intended for use in patients <18 years of age.     eGFR results are calculated without a race factor using  the 2021 CKD-EPI equation. Careful clinical correlation is recommended, particularly when comparing to results calculated using previous equations.  The CKD-EPI equation is less accurate in patients with extremes of muscle mass, extra-renal metabolism of creatinine, excessive creatine ingestion, or following therapy that affects renal tubular secretion.      Calcium 07/21/2024 9.0  8.3 - 10.4 MG/DL Final    Total Bilirubin 07/21/2024 0.5  0.2 - 1.1 MG/DL Final    ALT 07/21/2024 8 (L)  13.0 - 61.0 U/L Final    AST 07/21/2024 19  15 - 37 U/L Final    Alk Phosphatase 07/21/2024 100  45.0 - 117.0 U/L Final    Total Protein 07/21/2024 6.7  6.4 - 8.2 g/dL Final    Albumin 07/21/2024 3.7  3.2 - 4.6 g/dL Final    Globulin 07/21/2024 3.0  2.8 - 4.5 g/dL Final    Albumin/Globulin Ratio 07/21/2024 1.2  0.4 - 1.6   Final    Lipase 07/21/2024 81 (H)  13 - 60 U/L Final    Color, UA 07/21/2024 YELLOW    Final    Appearance 07/21/2024 SLIGHTLY CLOUDY    Final    Specific Gravity, UA 07/21/2024 1.015  1.001 - 1.023   Final    pH, Urine 07/21/2024 6.5  5.0 - 9.0   Final    Protein, UA 07/21/2024 100 (A)  NEG mg/dL Final    Glucose, Ur 07/21/2024 Negative  mg/dL Final    Ketones, Urine 07/21/2024 Negative  NEG mg/dL Final    Bilirubin, Urine 07/21/2024 Negative  NEG   Final    Blood, Urine 07/21/2024 LARGE (A)  NEG   Final    Urobilinogen, Urine 07/21/2024 0.2  0.2 - 1.0 EU/dL Final    Nitrite, Urine 07/21/2024 Negative  NEG   Final    Leukocyte Esterase, Urine 07/21/2024 SMALL (A)  NEG   Final    WBC, UA 07/21/2024 10-20  0 /hpf Final    RBC, UA 07/21/2024   0 /hpf Final

## 2024-08-21 ENCOUNTER — PROCEDURE VISIT (OUTPATIENT)
Dept: UROLOGY | Age: 89
End: 2024-08-21
Payer: MEDICARE

## 2024-08-21 DIAGNOSIS — N40.0 BENIGN PROSTATIC HYPERPLASIA WITHOUT LOWER URINARY TRACT SYMPTOMS: Primary | ICD-10-CM

## 2024-08-21 DIAGNOSIS — I50.9 CONGESTIVE HEART FAILURE, UNSPECIFIED HF CHRONICITY, UNSPECIFIED HEART FAILURE TYPE (HCC): ICD-10-CM

## 2024-08-21 DIAGNOSIS — R33.9 URINARY RETENTION: ICD-10-CM

## 2024-08-21 DIAGNOSIS — R97.20 ELEVATED PROSTATE SPECIFIC ANTIGEN (PSA): ICD-10-CM

## 2024-08-21 PROCEDURE — 1123F ACP DISCUSS/DSCN MKR DOCD: CPT | Performed by: UROLOGY

## 2024-08-21 PROCEDURE — 99214 OFFICE O/P EST MOD 30 MIN: CPT | Performed by: UROLOGY

## 2024-08-21 NOTE — PROGRESS NOTES
[facility]  [address]   [address]  [place] [address]  [phone]  Manjit Arroyo  : 1931    No chief complaint on file.       HPI     Manjit Arroyo is a 89 y.o. male Patient was seen just 2 weeks ago for urinary retention.  He experienced retention secondary to constipation.  He is accompanied by the son today.  The son tells me that they have been in the ER 4 or 5 times this month secondary to constipation.  He has yet to be referred to GI due to his constipation.  I will send a referral today.  He had his catheter replaced earlier this week due to inability to void.  He is on tamsulosin.  He has been using that daily.  He is followed here by Dr. Wood.  See his history as below.     History as copied from [facility] notes:  89-year-old man admitted to [facility] on 2024 for rehabilitation efforts related to debility. The patient has prior history of coronary artery disease, moderate aortic stenosis, heart failure with reduced ejection fraction, hypertension, chronic kidney disease stage IV, and bradycardia status post pacemaker. He was admitted to [facility] on 2024 being transferred from [facility]. Patient was admitted with gross hematuria and required Stapleton placement for urinary retention. He is found to be hypotensive with prior medication regiment of spironolactone, Lasix, metoprolol. Patient's antihypertensive medications were held and he was stabilized. Urology placed the Stapleton catheter and recommended that he be discharged with a catheter with follow-up with his urologist Dr. Wood. Patient did undergo cystoscopy with clot evacuation on 6/15/2024. This was done by Dr. Carlisle. His hematuria was subsequently clear. He also required treatment for right lower lobe pneumonia with acute hypoxic and respiratory insufficiency. He was treated with a course of antibiotics initially ceftriaxone and then doxycycline and his

## 2024-08-24 ENCOUNTER — HOSPITAL ENCOUNTER (EMERGENCY)
Age: 89
Discharge: HOME | End: 2024-08-24
Attending: EMERGENCY MEDICINE
Payer: MEDICARE

## 2024-08-24 VITALS
WEIGHT: 198 LBS | SYSTOLIC BLOOD PRESSURE: 100 MMHG | BODY MASS INDEX: 28.41 KG/M2 | RESPIRATION RATE: 15 BRPM | TEMPERATURE: 97.6 F | HEART RATE: 75 BPM | DIASTOLIC BLOOD PRESSURE: 67 MMHG | OXYGEN SATURATION: 96 %

## 2024-08-24 DIAGNOSIS — R31.9 HEMATURIA, UNSPECIFIED TYPE: Primary | ICD-10-CM

## 2024-08-24 LAB
APPEARANCE UR: ABNORMAL
BACTERIA URNS QL MICRO: NORMAL /HPF
BILIRUB UR QL: NEGATIVE
COLOR UR: ABNORMAL
EPI CELLS #/AREA URNS HPF: NORMAL /HPF
GLUCOSE UR STRIP.AUTO-MCNC: NEGATIVE MG/DL
HGB UR QL STRIP: ABNORMAL
KETONES UR QL STRIP.AUTO: NEGATIVE MG/DL
LEUKOCYTE ESTERASE UR QL STRIP.AUTO: ABNORMAL
MUCOUS THREADS URNS QL MICRO: 0 /LPF
NITRITE UR QL STRIP.AUTO: NEGATIVE
OTHER OBSERVATIONS: NORMAL
PH UR STRIP: 5 (ref 5–9)
PROT UR STRIP-MCNC: NEGATIVE MG/DL
RBC #/AREA URNS HPF: NORMAL /HPF
SP GR UR REFRACTOMETRY: 1.01 (ref 1–1.02)
UROBILINOGEN UR QL STRIP.AUTO: 0.2 EU/DL (ref 0.2–1)
WBC URNS QL MICRO: NORMAL /HPF

## 2024-08-24 PROCEDURE — 81001 URINALYSIS AUTO W/SCOPE: CPT

## 2024-08-24 PROCEDURE — 99283 EMERGENCY DEPT VISIT LOW MDM: CPT

## 2024-08-24 PROCEDURE — 51702 INSERT TEMP BLADDER CATH: CPT

## 2024-08-24 ASSESSMENT — ENCOUNTER SYMPTOMS: ABDOMINAL PAIN: 0

## 2024-08-24 ASSESSMENT — PAIN - FUNCTIONAL ASSESSMENT: PAIN_FUNCTIONAL_ASSESSMENT: NONE - DENIES PAIN

## 2024-08-24 NOTE — ED TRIAGE NOTES
Pt reports here for noticing blood in his urine this morning, denies pain, has a catheter.  Pt here w/ son.

## 2024-08-24 NOTE — ED PROVIDER NOTES
Gatherings with Friends and Family: Three times a week   Intimate Partner Violence: Not At Risk (6/17/2024)    Received from [facility], [facility]    Intimate Partner Violence     Fear of Current or Ex-Partner: No     Emotionally Abused: No     Physically Abused: No     Sexually Abused: No   Housing Stability: Unknown (7/18/2024)    Housing Stability Vital Sign     Unstable Housing in the Last Year: No        Allergies: Latex and Adhesive tape    Previous Medications    ASPIRIN 81 MG EC TABLET    Take by mouth daily    ATORVASTATIN (LIPITOR) 40 MG TABLET    Take 1 tablet by mouth daily TAKE 1 TABLET BY MOUTH EVERY DAY    BRIMONIDINE (ALPHAGAN) 0.2 % OPHTHALMIC SOLUTION    APPLY 1 DROP IN RIGHT EYE TWICE A DAY    CETIRIZINE (ZYRTEC) 10 MG TABLET    Take 1 tablet by mouth    FEXOFENADINE (ALLEGRA) 180 MG TABLET    Take 1 tablet by mouth daily    FOLIC ACID (FOLVITE) 800 MCG TABLET    Take 1 tablet by mouth daily    FUROSEMIDE (LASIX) 20 MG TABLET    Take 1 tablet by mouth daily    GABAPENTIN (NEURONTIN) 600 MG TABLET    Take 1 tablet by mouth 2 times daily for 90 days.    HYDROCORTISONE 2.5 % CREAM    Apply topically 2 times daily.    IPRATROPIUM (ATROVENT) 0.06 % NASAL SPRAY    2 sprays by Each Nostril route 3 times daily as needed for Rhinitis    ISOSORBIDE MONONITRATE (IMDUR) 30 MG EXTENDED RELEASE TABLET    Take 1 tablet by mouth daily    LATANOPROST (XALATAN) 0.005 % OPHTHALMIC SOLUTION    Apply 1 drop to eye 2 times daily    MAGNESIUM CITRATE SOLUTION    Take 296 mLs by mouth once for 1 dose    METOPROLOL SUCCINATE (TOPROL XL) 50 MG EXTENDED RELEASE TABLET    Take 1 tablet by mouth daily    OMEPRAZOLE (PRILOSEC OTC) 20 MG TABLET    Take 1 tablet by mouth    TAMSULOSIN (FLOMAX) 0.4 MG CAPSULE    Take 1 capsule by mouth daily        Vitals signs and nursing note reviewed.   Patient Vitals for the past 4 hrs:   Temp Pulse Resp BP SpO2   08/24/24 1143 -- -- -- 114/81 94 %   08/24/24 1127 -- -- -- -- 94 %    1220 Nursing has replaced Stapleton and the urine is free-flowing and yellow in the bag.  He has not had any further episodes of hematuria.  I explained the results and plan.  Patient is comfortable with discharge. [CW]      ED Course User Index  [CW] Jesús Starks MD       I have considered all emergent medical conditions that could have caused patient's presentation to the emergency department today.  Based on their history, physical, and thorough evaluation, I have excluded all emergent medical conditions that require any further evaluation today in the ED or hospital.  I feel that the patient is safe for discharge.  The diagnosis and plan as well as the results of any testing (if done) and treatments (if done) today in the emergency department were communicated to the patient and/or their family/caregiver (if present).  The patient/caregiver verbalized understanding and compliance with the treatment plan.  Strict emergency department return precautions were given.  All questions and concerns were answered.      ICD-10-CM    1. Hematuria, unspecified type  R31.9           DISPOSITION Decision To Discharge 08/24/2024 12:34:39 PM  Condition at Disposition: Good       Voice dictation software was used during the making of this note.  This software is not perfect and grammatical and other typographical errors may be present.  This note has not been completely proofread for errors.     Jesús Starks MD  08/24/24 9835

## 2024-08-24 NOTE — ED NOTES
Patient mobility status  with difficulty, uses a rollater . Provider aware     I have reviewed discharge instructions with the patient.  The patient verbalized understanding.    Patient left ED via Discharge Method: ambulatory to Home with Child.    Opportunity for questions and clarification provided.     Patient given 0 scripts.

## 2024-08-24 NOTE — ED NOTES
Pt jones catheter d/c'd as ordered, pt tolerated well. Dry blood noted at insertion site, cleaned with perineal wipes. Pt noted to have excoriated areas around penis.

## 2024-08-28 ENCOUNTER — OFFICE VISIT (OUTPATIENT)
Dept: FAMILY MEDICINE CLINIC | Facility: CLINIC | Age: 89
End: 2024-08-28

## 2024-08-28 ENCOUNTER — TELEPHONE (OUTPATIENT)
Dept: UROLOGY | Age: 89
End: 2024-08-28

## 2024-08-28 ENCOUNTER — TELEPHONE (OUTPATIENT)
Dept: FAMILY MEDICINE CLINIC | Facility: CLINIC | Age: 89
End: 2024-08-28

## 2024-08-28 VITALS
DIASTOLIC BLOOD PRESSURE: 72 MMHG | HEART RATE: 80 BPM | SYSTOLIC BLOOD PRESSURE: 112 MMHG | OXYGEN SATURATION: 98 % | HEIGHT: 70 IN | WEIGHT: 209 LBS | BODY MASS INDEX: 29.92 KG/M2 | TEMPERATURE: 97.9 F

## 2024-08-28 DIAGNOSIS — R79.89 ELEVATED SERUM CREATININE: Primary | ICD-10-CM

## 2024-08-28 DIAGNOSIS — I10 ESSENTIAL (PRIMARY) HYPERTENSION: ICD-10-CM

## 2024-08-28 DIAGNOSIS — Z00.00 MEDICARE ANNUAL WELLNESS VISIT, SUBSEQUENT: ICD-10-CM

## 2024-08-28 DIAGNOSIS — Z12.5 SCREENING PSA (PROSTATE SPECIFIC ANTIGEN): ICD-10-CM

## 2024-08-28 DIAGNOSIS — E78.2 MIXED HYPERLIPIDEMIA: ICD-10-CM

## 2024-08-28 LAB
ALBUMIN SERPL-MCNC: 3.7 G/DL (ref 3.2–4.6)
ALBUMIN/GLOB SERPL: 1.1 (ref 1–1.9)
ALP SERPL-CCNC: 81 U/L (ref 40–129)
ALT SERPL-CCNC: 10 U/L (ref 12–65)
ANION GAP SERPL CALC-SCNC: 11 MMOL/L (ref 9–18)
AST SERPL-CCNC: 25 U/L (ref 15–37)
BASOPHILS # BLD: 0.1 K/UL (ref 0–0.2)
BASOPHILS NFR BLD: 1 % (ref 0–2)
BILIRUB SERPL-MCNC: 0.7 MG/DL (ref 0–1.2)
BUN SERPL-MCNC: 42 MG/DL (ref 8–23)
CALCIUM SERPL-MCNC: 9.2 MG/DL (ref 8.8–10.2)
CHLORIDE SERPL-SCNC: 104 MMOL/L (ref 98–107)
CHOLEST SERPL-MCNC: 170 MG/DL (ref 0–200)
CO2 SERPL-SCNC: 25 MMOL/L (ref 20–28)
CREAT SERPL-MCNC: 3.22 MG/DL (ref 0.8–1.3)
DIFFERENTIAL METHOD BLD: ABNORMAL
EOSINOPHIL # BLD: 0.2 K/UL (ref 0–0.8)
EOSINOPHIL NFR BLD: 5 % (ref 0.5–7.8)
ERYTHROCYTE [DISTWIDTH] IN BLOOD BY AUTOMATED COUNT: 14.4 % (ref 11.9–14.6)
GLOBULIN SER CALC-MCNC: 3.2 G/DL (ref 2.3–3.5)
GLUCOSE SERPL-MCNC: 116 MG/DL (ref 70–99)
HCT VFR BLD AUTO: 36.4 % (ref 41.1–50.3)
HDLC SERPL-MCNC: 46 MG/DL (ref 40–60)
HDLC SERPL: 3.7 (ref 0–5)
HGB BLD-MCNC: 11.5 G/DL (ref 13.6–17.2)
IMM GRANULOCYTES # BLD AUTO: 0 K/UL (ref 0–0.5)
IMM GRANULOCYTES NFR BLD AUTO: 0 % (ref 0–5)
LDLC SERPL CALC-MCNC: 97 MG/DL (ref 0–100)
LYMPHOCYTES # BLD: 1.2 K/UL (ref 0.5–4.6)
LYMPHOCYTES NFR BLD: 23 % (ref 13–44)
MCH RBC QN AUTO: 29.3 PG (ref 26.1–32.9)
MCHC RBC AUTO-ENTMCNC: 31.6 G/DL (ref 31.4–35)
MCV RBC AUTO: 92.9 FL (ref 82–102)
MONOCYTES # BLD: 0.5 K/UL (ref 0.1–1.3)
MONOCYTES NFR BLD: 11 % (ref 4–12)
NEUTS SEG # BLD: 3 K/UL (ref 1.7–8.2)
NEUTS SEG NFR BLD: 60 % (ref 43–78)
NRBC # BLD: 0 K/UL (ref 0–0.2)
PLATELET # BLD AUTO: 175 K/UL (ref 150–450)
PMV BLD AUTO: 13.1 FL (ref 9.4–12.3)
POTASSIUM SERPL-SCNC: 4.8 MMOL/L (ref 3.5–5.1)
PROT SERPL-MCNC: 6.9 G/DL (ref 6.3–8.2)
PSA SERPL-MCNC: 5.9 NG/ML (ref 0–4)
RBC # BLD AUTO: 3.92 M/UL (ref 4.23–5.6)
SODIUM SERPL-SCNC: 141 MMOL/L (ref 136–145)
TRIGL SERPL-MCNC: 134 MG/DL (ref 0–150)
TSH, 3RD GENERATION: 2.27 UIU/ML (ref 0.27–4.2)
VLDLC SERPL CALC-MCNC: 27 MG/DL (ref 6–23)
WBC # BLD AUTO: 5 K/UL (ref 4.3–11.1)

## 2024-08-28 ASSESSMENT — PATIENT HEALTH QUESTIONNAIRE - PHQ9
SUM OF ALL RESPONSES TO PHQ QUESTIONS 1-9: 0
1. LITTLE INTEREST OR PLEASURE IN DOING THINGS: NOT AT ALL
SUM OF ALL RESPONSES TO PHQ QUESTIONS 1-9: 0
SUM OF ALL RESPONSES TO PHQ9 QUESTIONS 1 & 2: 0
2. FEELING DOWN, DEPRESSED OR HOPELESS: NOT AT ALL

## 2024-08-28 ASSESSMENT — LIFESTYLE VARIABLES
HOW MANY STANDARD DRINKS CONTAINING ALCOHOL DO YOU HAVE ON A TYPICAL DAY: PATIENT DOES NOT DRINK
HOW OFTEN DO YOU HAVE A DRINK CONTAINING ALCOHOL: NEVER

## 2024-08-28 NOTE — TELEPHONE ENCOUNTER
Patient's son called stating they called urology and are now waiting for a response from them about an appointment for the procedure

## 2024-08-28 NOTE — TELEPHONE ENCOUNTER
Pt son is calling in stating he was told by MD he would call pt and have him scheduled for procedure and never heard anything as of today. Son stated he wants a urgent appt asap due to Pt having jones for 2 months.Son wants a callback asap @ [phone] Gerardo

## 2024-08-29 ENCOUNTER — TELEPHONE (OUTPATIENT)
Dept: UROLOGY | Age: 89
End: 2024-08-29

## 2024-08-29 NOTE — TELEPHONE ENCOUNTER
Son called wanting to know when they were going to hear back from Wood about getting him scheduled for a procedure because he has had his jones for 2 months.. He called yesterday as well and a encounter was sent over as well. I let him know message was sent and someone would be in touch.

## 2024-09-03 ENCOUNTER — TELEPHONE (OUTPATIENT)
Dept: UROLOGY | Age: 89
End: 2024-09-03

## 2024-09-03 NOTE — TELEPHONE ENCOUNTER
Son states pt is growing anxious due to having catheter this long and multiple trips to ER. He states they have left several messages but have not heard back from Wood.

## 2024-09-04 ENCOUNTER — TELEPHONE (OUTPATIENT)
Dept: UROLOGY | Age: 89
End: 2024-09-04

## 2024-09-04 ENCOUNTER — TELEPHONE (OUTPATIENT)
Dept: FAMILY MEDICINE CLINIC | Facility: CLINIC | Age: 89
End: 2024-09-04

## 2024-09-04 NOTE — TELEPHONE ENCOUNTER
CALLED PT BACK REGARDING PRIOR CALL AS PER PT HE WAS VERY UPSET UROLOGY IS NOT RETURNING HIS CALL BACK REGARDING HIS SURGERY I CALLED UROLOGY THEY STATED DR KIRAN IS ON VACATION UNTIL MONDAY AND THAT THEY WILL CALL PT BACK ON LATEST TUESDAY TO SEE WHAT THE NEXT PROCEDURE  PT IS AWARE

## 2024-09-09 ENCOUNTER — TELEPHONE (OUTPATIENT)
Dept: UROLOGY | Age: 89
End: 2024-09-09

## 2024-09-09 NOTE — TELEPHONE ENCOUNTER
I received a VM from the patient asking when he would be scheduled for surgery. He's had multiple trips to the ER and still has a catheter.

## 2024-09-10 ENCOUNTER — TELEPHONE (OUTPATIENT)
Dept: UROLOGY | Age: 89
End: 2024-09-10

## 2024-09-10 ENCOUNTER — PREP FOR PROCEDURE (OUTPATIENT)
Dept: UROLOGY | Age: 89
End: 2024-09-10

## 2024-09-10 DIAGNOSIS — N40.1 BENIGN PROSTATIC HYPERPLASIA WITH URINARY RETENTION: Primary | ICD-10-CM

## 2024-09-10 DIAGNOSIS — R33.8 BENIGN PROSTATIC HYPERPLASIA WITH URINARY RETENTION: Primary | ICD-10-CM

## 2024-09-13 ENCOUNTER — OFFICE VISIT (OUTPATIENT)
Dept: FAMILY MEDICINE CLINIC | Facility: CLINIC | Age: 89
End: 2024-09-13
Payer: MEDICARE

## 2024-09-13 VITALS
OXYGEN SATURATION: 99 % | HEIGHT: 70 IN | TEMPERATURE: 97.1 F | WEIGHT: 213 LBS | SYSTOLIC BLOOD PRESSURE: 102 MMHG | DIASTOLIC BLOOD PRESSURE: 70 MMHG | HEART RATE: 80 BPM | BODY MASS INDEX: 30.49 KG/M2

## 2024-09-13 DIAGNOSIS — L23.1 CONTACT DERMATITIS DUE TO ADHESIVES, UNSPECIFIED CONTACT DERMATITIS TYPE: Primary | ICD-10-CM

## 2024-09-13 PROBLEM — N40.1 ENLARGED PROSTATE WITH LOWER URINARY TRACT SYMPTOMS (LUTS): Status: ACTIVE | Noted: 2024-09-04

## 2024-09-13 PROCEDURE — 99213 OFFICE O/P EST LOW 20 MIN: CPT | Performed by: FAMILY MEDICINE

## 2024-09-13 PROCEDURE — 1123F ACP DISCUSS/DSCN MKR DOCD: CPT | Performed by: FAMILY MEDICINE

## 2024-09-23 ENCOUNTER — TELEPHONE (OUTPATIENT)
Dept: FAMILY MEDICINE CLINIC | Facility: CLINIC | Age: 89
End: 2024-09-23

## 2024-09-23 DIAGNOSIS — M79.89 PAIN AND SWELLING OF TOE, UNSPECIFIED LATERALITY: Primary | ICD-10-CM

## 2024-09-23 DIAGNOSIS — M79.676 PAIN AND SWELLING OF TOE, UNSPECIFIED LATERALITY: Primary | ICD-10-CM

## 2024-10-01 ENCOUNTER — HOSPITAL ENCOUNTER (OUTPATIENT)
Dept: SURGERY | Age: 89
Discharge: HOME | End: 2024-10-04
Payer: MEDICARE

## 2024-10-01 VITALS — SYSTOLIC BLOOD PRESSURE: 120 MMHG | HEART RATE: 79 BPM | DIASTOLIC BLOOD PRESSURE: 82 MMHG | OXYGEN SATURATION: 95 %

## 2024-10-01 DIAGNOSIS — N40.1 BENIGN PROSTATIC HYPERPLASIA WITH URINARY RETENTION: ICD-10-CM

## 2024-10-01 DIAGNOSIS — R33.8 BENIGN PROSTATIC HYPERPLASIA WITH URINARY RETENTION: ICD-10-CM

## 2024-10-01 LAB
APPEARANCE UR: ABNORMAL
BACTERIA URNS QL MICRO: ABNORMAL /HPF
BASOPHILS # BLD: 0 K/UL (ref 0–0.2)
BASOPHILS NFR BLD: 1 % (ref 0–2)
BILIRUB UR QL: NEGATIVE
COLOR UR: ABNORMAL
CREAT SERPL-MCNC: 3.14 MG/DL (ref 0.8–1.3)
DIFFERENTIAL METHOD BLD: ABNORMAL
EOSINOPHIL # BLD: 0.2 K/UL (ref 0–0.8)
EOSINOPHIL NFR BLD: 5 % (ref 0.5–7.8)
EPI CELLS #/AREA URNS HPF: ABNORMAL /HPF
ERYTHROCYTE [DISTWIDTH] IN BLOOD BY AUTOMATED COUNT: 14.6 % (ref 11.9–14.6)
GLUCOSE UR STRIP.AUTO-MCNC: NEGATIVE MG/DL
HCT VFR BLD AUTO: 36.5 % (ref 41.1–50.3)
HGB BLD-MCNC: 11.7 G/DL (ref 13.6–17.2)
HGB UR QL STRIP: ABNORMAL
IMM GRANULOCYTES # BLD AUTO: 0 K/UL (ref 0–0.5)
IMM GRANULOCYTES NFR BLD AUTO: 0 % (ref 0–5)
KETONES UR QL STRIP.AUTO: NEGATIVE MG/DL
LEUKOCYTE ESTERASE UR QL STRIP.AUTO: ABNORMAL
LYMPHOCYTES # BLD: 1.2 K/UL (ref 0.5–4.6)
LYMPHOCYTES NFR BLD: 22 % (ref 13–44)
MCH RBC QN AUTO: 28.3 PG (ref 26.1–32.9)
MCHC RBC AUTO-ENTMCNC: 32.1 G/DL (ref 31.4–35)
MCV RBC AUTO: 88.2 FL (ref 82–102)
MONOCYTES # BLD: 0.6 K/UL (ref 0.1–1.3)
MONOCYTES NFR BLD: 12 % (ref 4–12)
NEUTS SEG # BLD: 3.2 K/UL (ref 1.7–8.2)
NEUTS SEG NFR BLD: 60 % (ref 43–78)
NITRITE UR QL STRIP.AUTO: NEGATIVE
NRBC # BLD: 0 K/UL (ref 0–0.2)
OTHER OBSERVATIONS: ABNORMAL
PH UR STRIP: 6.5 (ref 5–9)
PLATELET # BLD AUTO: 154 K/UL (ref 150–450)
PMV BLD AUTO: 13.6 FL (ref 9.4–12.3)
POTASSIUM SERPL-SCNC: 4.4 MMOL/L (ref 3.5–5.1)
PROT UR STRIP-MCNC: NEGATIVE MG/DL
RBC # BLD AUTO: 4.14 M/UL (ref 4.23–5.6)
RBC #/AREA URNS HPF: ABNORMAL /HPF
SP GR UR REFRACTOMETRY: 1.01 (ref 1–1.02)
UROBILINOGEN UR QL STRIP.AUTO: 1 EU/DL (ref 0.2–1)
WBC # BLD AUTO: 5.3 K/UL (ref 4.3–11.1)
WBC URNS QL MICRO: ABNORMAL /HPF

## 2024-10-01 PROCEDURE — 87088 URINE BACTERIA CULTURE: CPT

## 2024-10-01 PROCEDURE — 84132 ASSAY OF SERUM POTASSIUM: CPT

## 2024-10-01 PROCEDURE — 81001 URINALYSIS AUTO W/SCOPE: CPT

## 2024-10-01 PROCEDURE — 87186 SC STD MICRODIL/AGAR DIL: CPT

## 2024-10-01 PROCEDURE — 87086 URINE CULTURE/COLONY COUNT: CPT

## 2024-10-01 PROCEDURE — 82565 ASSAY OF CREATININE: CPT

## 2024-10-01 PROCEDURE — 85025 COMPLETE CBC W/AUTO DIFF WBC: CPT

## 2024-10-01 NOTE — PERIOP NOTE
PLEASE CONTINUE TAKING ALL PRESCRIPTION MEDICATIONS UP TO THE DAY OF SURGERY UNLESS OTHERWISE DIRECTED BELOW.   TAKE ONLY THE MEDICATIONS LISTED HERE ON THE DAY OF SURGERY DATE OF SURGERY: 10/3/24   Atorvastatin  Gabapentin  Isosorbide  Metoprolol   Omperazole  Tamsulosin          PRESCRIPTION MEDICATION TO HOLD- PLEASE DO NOT STOP ANY PRESCRIPTION MEDICATIONS UNLESS SPECIFIED BELOW:      Please stop all vitamins & supplements 7 days prior to surgery and stop all NSAIDS (Excedrin/BC & Goody Powder, ibuprofen/Motrin/Advil, naproxen/Aleve) 5 days before your surgery.  Should you have a surgery date that does not allow for the amount of time instructed above, please stop taking vitamins, supplements, and NSAIDS IMMEDIATELY.      PRESCRIPTION MEDICATIONS TO HOLD :    Follow instructions regarding aspirin hold from your surgeon     Comments   10/2/24 the day before surgery please take 2 Tylenol in the morning and then again before bed (DO NOT USE TYLENOL/ACETAMINOPHEN IF YOU HAVE A KNOWN HISTORY OF LIVER DISEASE). You may use either regular or extra strength.              Please do not bring home medications with you on the day of surgery unless otherwise directed by your nurse.  If you are instructed to bring home medications, please give them to your nurse as they will be administered by the nursing staff.    If you have any questions, please call [facility] [phone].    A copy of this note was provided to the patient for reference.       Please do not bring home medications with you on the day of surgery unless otherwise directed by your nurse.  If you are instructed to bring home medications, please give them to your nurse as they will be administered by the nursing staff.    If you have any questions, please call [facility] [phone] or [facility] [phone].    A copy of this note was provided to the patient for reference.

## 2024-10-01 NOTE — PERIOP NOTE
Latest Reference Range & Units 10/01/24 15:11   Potassium 3.5 - 5.1 mmol/L 4.4   Creatinine 0.80 - 1.30 MG/DL 3.14 (H)   WBC 4.3 - 11.1 K/uL 5.3   RBC 4.23 - 5.6 M/uL 4.14 (L)   Hemoglobin Quant 13.6 - 17.2 g/dL 11.7 (L)   Hematocrit 41.1 - 50.3 % 36.5 (L)   MCV 82.0 - 102.0 FL 88.2   MCH 26.1 - 32.9 PG 28.3   MCHC 31.4 - 35.0 g/dL 32.1   MPV 9.4 - 12.3 FL 13.6 (H)   RDW 11.9 - 14.6 % 14.6   Platelet Count 150 - 450 K/uL 154   Neutrophils % 43 - 78 % 60   Lymphocyte % 13 - 44 % 22   Monocytes % 4.0 - 12.0 % 12   Eosinophils % 0.5 - 7.8 % 5   Basophils % 0.0 - 2.0 % 1   Neutrophils Absolute 1.7 - 8.2 K/UL 3.2   Lymphocytes Absolute 0.5 - 4.6 K/UL 1.2   Monocytes Absolute 0.1 - 1.3 K/UL 0.6   Eosinophils Absolute 0.0 - 0.8 K/UL 0.2   Basophils Absolute 0.0 - 0.2 K/UL 0.0   Differential Type -   AUTOMATED   Immature Granulocytes % 0.0 - 5.0 % 0   Nucleated Red Blood Cells 0.0 - 0.2 K/uL 0.00   Immature Granulocytes Absolute 0.0 - 0.5 K/UL 0.0   CULTURE, URINE  Rpt (IP)   Color, UA -   YELLOW/STRAW   Glucose, Ur NEG mg/dL Negative   Bilirubin, Urine NEG   Negative   Ketones, Urine NEG mg/dL Negative   Specific Gravity, UA 1.001 - 1.023   1.007   Blood, Urine NEG   TRACE !   Protein, UA NEG mg/dL Negative   Urobilinogen 0.2 - 1.0 EU/dL 1.0   Nitrite, Urine NEG   Negative   Leukocyte Esterase, Urine NEG   LARGE !   Appearance -   CLOUDY   pH, Urine 5.0 - 9.0   6.5   WBC, UA 0 /hpf 20-50   RBC, UA 0 /hpf 0-3   Epithelial Cells, UA 0 /hpf 5-10   Bacteria, UA 0 /hpf 4+ (H)   Other observations -   RESULTS VERIFIED MANUALLY   (H): Data is abnormally high  (L): Data is abnormally low  !: Data is abnormal  (IP): In Process  Rpt: View report in Results Review for more information

## 2024-10-01 NOTE — PERIOP NOTE
Patient confirms name and . Order to obtain consent found in EHR and matches case posting.    Type 2 surgery,  assessment complete.    Labs per surgeon: CBC, UA and Ucx. Stapleton was clamped and drained at hub.  Labs per anesthesia protocol: potassium and creatinine today, T&S on DOS in preop  EK24, previous EKG 24, Echo 3/1/24, Pacer Interrogation 24 , most recent cardiology note 24 available in [employer]. Reviewed Echo and Pacer history with Rep and Warren LOFTON. Patient is paced at 75 bpm and magnet rate is 85 bpm. Per Dr Murry Rep is not required in house on DOS.  Glucose:not indicated    Pt provided list of medications that had not been updated, he also stated some medications from memory to the best of his ability with a good sofya effort. All prior to admission medications documented in [facility] as stated by the patient. Patient's son is present today but does not know the patient's medications.    Pt answered medical and surgical history questions to the best of their ability.   Patient provided with and instructed on educational handouts including Guide to Surgery, Pain Management, Hand Hygiene, Blood Transfusion Education, and [facility] Brochure.  Hibiclens/Dynahex antiseptic wash and instructions given per hospital policy.  Patient instructed on the following:  Arrive at [address] [place] [address] (enter at front entrance by statue abdirahman Herrera). Check in on the left at the Admissions office.    Time of arrival to be called the day before by 1700  NPO after midnight including gum, mints, and ice chips & NO TOBACCO.  Responsible adult must drive patient to the hospital, stay during surgery, and patient will require supervision 24 hours after anesthesia.  Use hospital approved antiseptic wash provided today  in shower the night before surgery and on the morning of surgery.  Leave all valuables (money and jewelry) at home but bring insurance

## 2024-10-03 ENCOUNTER — HOSPITAL ENCOUNTER (INPATIENT)
Age: 89
LOS: 4 days | Discharge: HOME HEALTH | DRG: 713 | End: 2024-10-07
Attending: UROLOGY | Admitting: UROLOGY
Payer: MEDICARE

## 2024-10-03 ENCOUNTER — ANESTHESIA (OUTPATIENT)
Dept: SURGERY | Age: 88
End: 2024-10-03

## 2024-10-03 ENCOUNTER — ANESTHESIA EVENT (OUTPATIENT)
Dept: SURGERY | Age: 88
End: 2024-10-03

## 2024-10-03 DIAGNOSIS — Z98.890 STATUS POST CYSTOSCOPY: Primary | ICD-10-CM

## 2024-10-03 PROCEDURE — 3700000000 HC ANESTHESIA ATTENDED CARE: Performed by: UROLOGY

## 2024-10-03 PROCEDURE — 05HC33Z INSERTION OF INFUSION DEVICE INTO LEFT BASILIC VEIN, PERCUTANEOUS APPROACH: ICD-10-PCS | Performed by: UROLOGY

## 2024-10-03 PROCEDURE — 7100000000 HC PACU RECOVERY - FIRST 15 MIN: Performed by: UROLOGY

## 2024-10-03 PROCEDURE — 7100000001 HC PACU RECOVERY - ADDTL 15 MIN: Performed by: UROLOGY

## 2024-10-03 PROCEDURE — 36415 COLL VENOUS BLD VENIPUNCTURE: CPT

## 2024-10-03 PROCEDURE — 3600000014 HC SURGERY LEVEL 4 ADDTL 15MIN: Performed by: UROLOGY

## 2024-10-03 PROCEDURE — 52601 PROSTATECTOMY (TURP): CPT | Performed by: UROLOGY

## 2024-10-03 PROCEDURE — 85018 HEMOGLOBIN: CPT

## 2024-10-03 PROCEDURE — 2720000010 HC SURG SUPPLY STERILE: Performed by: UROLOGY

## 2024-10-03 PROCEDURE — 3700000001 HC ADD 15 MINUTES (ANESTHESIA): Performed by: UROLOGY

## 2024-10-03 PROCEDURE — 86900 BLOOD TYPING SEROLOGIC ABO: CPT

## 2024-10-03 PROCEDURE — 86850 RBC ANTIBODY SCREEN: CPT

## 2024-10-03 PROCEDURE — 85014 HEMATOCRIT: CPT

## 2024-10-03 PROCEDURE — 86901 BLOOD TYPING SEROLOGIC RH(D): CPT

## 2024-10-03 PROCEDURE — 3600000004 HC SURGERY LEVEL 4 BASE: Performed by: UROLOGY

## 2024-10-03 PROCEDURE — 88305 TISSUE EXAM BY PATHOLOGIST: CPT

## 2024-10-03 PROCEDURE — 2709999900 HC NON-CHARGEABLE SUPPLY: Performed by: UROLOGY

## 2024-10-03 PROCEDURE — 0VB08ZZ EXCISION OF PROSTATE, VIA NATURAL OR ARTIFICIAL OPENING ENDOSCOPIC: ICD-10-PCS | Performed by: UROLOGY

## 2024-10-03 PROCEDURE — 1100000000 HC RM PRIVATE

## 2024-10-03 RX ORDER — FAMOTIDINE 20 MG/1
20 TABLET, FILM COATED ORAL ONCE
Status: CANCELLED | OUTPATIENT
Start: 2024-10-03 | End: 2024-10-03

## 2024-10-03 RX ORDER — ONDANSETRON 2 MG/ML
4 INJECTION INTRAMUSCULAR; INTRAVENOUS
Status: CANCELLED | OUTPATIENT
Start: 2024-10-03 | End: 2024-10-04

## 2024-10-03 RX ORDER — ASPIRIN 81 MG/1
81 TABLET, CHEWABLE ORAL DAILY
Status: DISCONTINUED | OUTPATIENT
Start: 2024-10-04 | End: 2024-10-07 | Stop reason: HOSPADM

## 2024-10-03 RX ORDER — DIPHENHYDRAMINE HYDROCHLORIDE 50 MG/ML
12.5 INJECTION INTRAMUSCULAR; INTRAVENOUS
Status: CANCELLED | OUTPATIENT
Start: 2024-10-03 | End: 2024-10-04

## 2024-10-03 RX ORDER — TAMSULOSIN HYDROCHLORIDE 0.4 MG/1
0.4 CAPSULE ORAL DAILY
Status: DISCONTINUED | OUTPATIENT
Start: 2024-10-04 | End: 2024-10-04

## 2024-10-03 RX ORDER — NALOXONE HYDROCHLORIDE 0.4 MG/ML
INJECTION, SOLUTION INTRAMUSCULAR; INTRAVENOUS; SUBCUTANEOUS PRN
Status: CANCELLED | OUTPATIENT
Start: 2024-10-03

## 2024-10-03 RX ORDER — OXYCODONE HYDROCHLORIDE 5 MG/1
10 TABLET ORAL PRN
Status: CANCELLED | OUTPATIENT
Start: 2024-10-03 | End: 2024-10-03

## 2024-10-03 RX ORDER — SODIUM CHLORIDE 9 MG/ML
INJECTION, SOLUTION INTRAVENOUS CONTINUOUS
Status: CANCELLED | OUTPATIENT
Start: 2024-10-03

## 2024-10-03 RX ORDER — HYDROMORPHONE HYDROCHLORIDE 2 MG/ML
0.25 INJECTION, SOLUTION INTRAMUSCULAR; INTRAVENOUS; SUBCUTANEOUS EVERY 5 MIN PRN
Status: CANCELLED | OUTPATIENT
Start: 2024-10-03

## 2024-10-03 RX ORDER — FUROSEMIDE 20 MG
20 TABLET ORAL DAILY
Status: DISCONTINUED | OUTPATIENT
Start: 2024-10-04 | End: 2024-10-07 | Stop reason: HOSPADM

## 2024-10-03 RX ORDER — GABAPENTIN 300 MG/1
600 CAPSULE ORAL DAILY
Status: DISCONTINUED | OUTPATIENT
Start: 2024-10-04 | End: 2024-10-07 | Stop reason: HOSPADM

## 2024-10-03 RX ORDER — SODIUM CHLORIDE 0.9 % (FLUSH) 0.9 %
5-40 SYRINGE (ML) INJECTION PRN
Status: CANCELLED | OUTPATIENT
Start: 2024-10-03

## 2024-10-03 RX ORDER — SODIUM CHLORIDE, SODIUM LACTATE, POTASSIUM CHLORIDE, CALCIUM CHLORIDE 600; 310; 30; 20 MG/100ML; MG/100ML; MG/100ML; MG/100ML
INJECTION, SOLUTION INTRAVENOUS CONTINUOUS
Status: CANCELLED | OUTPATIENT
Start: 2024-10-03

## 2024-10-03 RX ORDER — MIDAZOLAM HYDROCHLORIDE 2 MG/2ML
2 INJECTION, SOLUTION INTRAMUSCULAR; INTRAVENOUS
Status: CANCELLED | OUTPATIENT
Start: 2024-10-03 | End: 2024-10-04

## 2024-10-03 RX ORDER — LIDOCAINE HYDROCHLORIDE 10 MG/ML
1 INJECTION, SOLUTION INFILTRATION; PERINEURAL
Status: CANCELLED | OUTPATIENT
Start: 2024-10-03 | End: 2024-10-04

## 2024-10-03 RX ORDER — OXYCODONE HYDROCHLORIDE 5 MG/1
5 TABLET ORAL EVERY 4 HOURS PRN
Status: DISCONTINUED | OUTPATIENT
Start: 2024-10-03 | End: 2024-10-07 | Stop reason: HOSPADM

## 2024-10-03 RX ORDER — OXYCODONE HYDROCHLORIDE 5 MG/1
5 TABLET ORAL PRN
Status: CANCELLED | OUTPATIENT
Start: 2024-10-03 | End: 2024-10-03

## 2024-10-03 RX ORDER — SODIUM CHLORIDE 9 MG/ML
INJECTION, SOLUTION INTRAVENOUS PRN
Status: CANCELLED | OUTPATIENT
Start: 2024-10-03

## 2024-10-03 RX ORDER — ATORVASTATIN CALCIUM 40 MG/1
40 TABLET, FILM COATED ORAL DAILY
Status: DISCONTINUED | OUTPATIENT
Start: 2024-10-04 | End: 2024-10-07 | Stop reason: HOSPADM

## 2024-10-03 RX ORDER — SODIUM CHLORIDE 0.9 % (FLUSH) 0.9 %
5-40 SYRINGE (ML) INJECTION EVERY 12 HOURS SCHEDULED
Status: CANCELLED | OUTPATIENT
Start: 2024-10-03

## 2024-10-03 RX ORDER — ACETAMINOPHEN 500 MG
1000 TABLET ORAL ONCE
Status: CANCELLED | OUTPATIENT
Start: 2024-10-03 | End: 2024-10-03

## 2024-10-03 RX ORDER — ISOSORBIDE MONONITRATE 30 MG/1
30 TABLET, EXTENDED RELEASE ORAL DAILY
Status: DISCONTINUED | OUTPATIENT
Start: 2024-10-04 | End: 2024-10-07 | Stop reason: HOSPADM

## 2024-10-03 RX ORDER — FENTANYL CITRATE 50 UG/ML
100 INJECTION, SOLUTION INTRAMUSCULAR; INTRAVENOUS
Status: CANCELLED | OUTPATIENT
Start: 2024-10-03 | End: 2024-10-04

## 2024-10-03 RX ORDER — METOPROLOL SUCCINATE 50 MG/1
50 TABLET, EXTENDED RELEASE ORAL DAILY
Status: DISCONTINUED | OUTPATIENT
Start: 2024-10-04 | End: 2024-10-07 | Stop reason: HOSPADM

## 2024-10-03 RX ORDER — SODIUM CHLORIDE 9 MG/ML
INJECTION, SOLUTION INTRAVENOUS CONTINUOUS
Status: DISCONTINUED | OUTPATIENT
Start: 2024-10-04 | End: 2024-10-04

## 2024-10-03 RX ORDER — HYDROMORPHONE HYDROCHLORIDE 2 MG/ML
0.5 INJECTION, SOLUTION INTRAMUSCULAR; INTRAVENOUS; SUBCUTANEOUS EVERY 10 MIN PRN
Status: CANCELLED | OUTPATIENT
Start: 2024-10-03

## 2024-10-03 RX ORDER — ACETAMINOPHEN 325 MG/1
650 TABLET ORAL EVERY 4 HOURS PRN
Status: DISCONTINUED | OUTPATIENT
Start: 2024-10-03 | End: 2024-10-07 | Stop reason: HOSPADM

## 2024-10-03 RX ORDER — PANTOPRAZOLE SODIUM 40 MG/1
40 TABLET, DELAYED RELEASE ORAL
Status: DISCONTINUED | OUTPATIENT
Start: 2024-10-04 | End: 2024-10-07 | Stop reason: HOSPADM

## 2024-10-03 RX ADMIN — EPHEDRINE SULFATE 30 MG: 5 INJECTION INTRAVENOUS at 09:47

## 2024-10-03 RX ADMIN — ONDANSETRON 4 MG: 2 INJECTION INTRAMUSCULAR; INTRAVENOUS at 08:03

## 2024-10-03 RX ADMIN — DEXAMETHASONE SODIUM PHOSPHATE 10 MG: 4 INJECTION, SOLUTION INTRA-ARTICULAR; INTRALESIONAL; INTRAMUSCULAR; INTRAVENOUS; SOFT TISSUE at 08:03

## 2024-10-03 RX ADMIN — SODIUM CHLORIDE, SODIUM LACTATE, POTASSIUM CHLORIDE, CALCIUM CHLORIDE: 600; 310; 30; 20 INJECTION, SOLUTION INTRAVENOUS at 07:45

## 2024-10-03 RX ADMIN — LIDOCAINE HYDROCHLORIDE 60 MG: 20 INJECTION, SOLUTION EPIDURAL; INFILTRATION; INTRACAUDAL; PERINEURAL at 07:55

## 2024-10-03 RX ADMIN — PROPOFOL 50 MG: 10 INJECTION, EMULSION INTRAVENOUS at 07:55

## 2024-10-03 RX ADMIN — PROPOFOL 10 MG: 10 INJECTION, EMULSION INTRAVENOUS at 07:57

## 2024-10-03 NOTE — ANESTHESIA PRE PROCEDURE
Department of Anesthesiology  Preprocedure Note       Name:  Manjit Arroyo   Age:  89 y.o.  :  1931                                          MRN:  [id number]         Date:  10/3/2024      Surgeon: Surgeon(s):  Rocky Wood Jr., MD    Procedure: Procedure(s):  CYSTOSCOPY MONOPOLAR TRANSURETHRAL RESECTION PROSTATE- Medtronic Pacer Depend L Chest    Medications prior to admission:   Prior to Admission medications    Medication Sig Start Date End Date Taking? Authorizing Provider   metoprolol succinate (TOPROL XL) 50 MG extended release tablet Take 1 tablet by mouth daily 24   Byron Meraz MD   isosorbide mononitrate (IMDUR) 30 MG extended release tablet Take 1 tablet by mouth daily 24   Byron Meraz MD   atorvastatin (LIPITOR) 40 MG tablet Take 1 tablet by mouth daily TAKE 1 TABLET BY MOUTH EVERY DAY  Patient taking differently: Take 1 tablet by mouth every morning TAKE 1 TABLET BY MOUTH EVERY DAY 24   Byron Meraz MD   hydrocortisone 2.5 % cream Apply topically 2 times daily. 24   Byron Meraz MD   tamsulosin (FLOMAX) 0.4 MG capsule Take 1 capsule by mouth daily 3/8/24   Giselle Zimmerman, APRN - NP   furosemide (LASIX) 20 MG tablet Take 1 tablet by mouth daily 23   Byron Meraz MD   ipratropium (ATROVENT) 0.06 % nasal spray 2 sprays by Each Nostril route 3 times daily as needed for Rhinitis 23   Tutu Kumar MD   gabapentin (NEURONTIN) 600 MG tablet Take 1 tablet by mouth 2 times daily for 90 days. 6/21/22 10/1/24  Byron Meraz MD   aspirin 81 MG EC tablet Take by mouth daily    Automatic Reconciliation, Ar   brimonidine (ALPHAGAN) 0.2 % ophthalmic solution APPLY 1 DROP IN RIGHT EYE TWICE A DAY 12/10/21   Automatic Reconciliation, Ar   folic acid (FOLVITE) 800 MCG tablet Take 1 tablet by mouth daily    Automatic Reconciliation, Ar   latanoprost (XALATAN) 0.005 % ophthalmic solution Apply 1 drop to eye 2 times daily 12/10/21   Automatic Reconciliation, Ar

## 2024-10-03 NOTE — OP NOTE
[address]  [address]                            OPERATIVE REPORT      PATIENT NAME: UMESH MARTINEZ          : 1931  MED REC NO: [id number]                       ROOM:   ACCOUNT NO: [id number]                       ADMIT DATE: 10/03/2024  PROVIDER: Rocky Wood Jr, MD    DATE OF SERVICE:  10/03/2024    PREOPERATIVE DIAGNOSES:  BPH with urinary retention.    POSTOPERATIVE DIAGNOSES:  BPH with urinary retention.    PROCEDURES PERFORMED:  ***    SURGEON:  Rocky Wood Jr, MD    ASSISTANT:  None.    ANESTHESIA:  General.    ESTIMATED BLOOD LOSS:  About 100 mL.    SPECIMENS REMOVED:  Prostate chips.    INTRAOPERATIVE FINDINGS:  Obstructing prostate with trilobar hypertrophy including a very prominent median lobe at the posterior bladder neck, which is with an obstruction.     COMPLICATIONS:  None.    Indication, as noted.    IMPLANTS:  None.    INDICATIONS:  The patient with urinary retention since 2024.    DESCRIPTION OF PROCEDURE:  The patient was given a general anesthetic, placed in the dorsal lithotomy position.  His Stapleton catheter was removed.  He was prepped and draped in a sterile fashion for cystoscopy.  A 28-Iranian continuous-flow resectoscope sheath was passed over an obturator in the bladder.  We used the monopolar resectoscope with 30-degree lens and video camera.    The bladder was inspected.  There were no stones or tumors noted.  There was some cystitis noted.  Both ureteral orifices were seen in normal position.  There is lateral lobe hypertrophy producing obstruction.  In addition, there was a prominent median lobe at 6 o'clock position of the bladder neck, which is contributing to the obstruction.  I began resection at the bladder neck at 6 o'clock, dissecting down to the adenomatous tissue until the circular bladder neck fibers were visualized.  They were resected distally down to the verumontanum.  The

## 2024-10-04 LAB
ABO + RH BLD: NORMAL
BLOOD GROUP ANTIBODIES SERPL: NORMAL
HCT VFR BLD AUTO: 33.2 % (ref 41.1–50.3)
HGB BLD-MCNC: 10.6 G/DL (ref 13.6–17.2)
SPECIMEN EXP DATE BLD: NORMAL

## 2024-10-04 PROCEDURE — 99231 SBSQ HOSP IP/OBS SF/LOW 25: CPT | Performed by: NURSE PRACTITIONER

## 2024-10-04 PROCEDURE — 6370000000 HC RX 637 (ALT 250 FOR IP): Performed by: UROLOGY

## 2024-10-04 PROCEDURE — 6370000000 HC RX 637 (ALT 250 FOR IP): Performed by: NURSE PRACTITIONER

## 2024-10-04 PROCEDURE — 1100000000 HC RM PRIVATE

## 2024-10-04 RX ORDER — SULFAMETHOXAZOLE/TRIMETHOPRIM 800-160 MG
1 TABLET ORAL EVERY 12 HOURS SCHEDULED
Status: DISCONTINUED | OUTPATIENT
Start: 2024-10-04 | End: 2024-10-05

## 2024-10-04 RX ORDER — AMOXICILLIN 500 MG/1
500 CAPSULE ORAL 2 TIMES DAILY
Status: DISCONTINUED | OUTPATIENT
Start: 2024-10-04 | End: 2024-10-05

## 2024-10-04 RX ORDER — SPIRONOLACTONE 25 MG/1
25 TABLET ORAL DAILY
COMMUNITY

## 2024-10-04 RX ADMIN — ASPIRIN 81 MG 81 MG: 81 TABLET ORAL at 08:53

## 2024-10-04 RX ADMIN — AMOXICILLIN 500 MG: 500 CAPSULE ORAL at 16:33

## 2024-10-04 RX ADMIN — GABAPENTIN 600 MG: 300 CAPSULE ORAL at 08:53

## 2024-10-04 RX ADMIN — SULFAMETHOXAZOLE AND TRIMETHOPRIM 1 TABLET: 800; 160 TABLET ORAL at 21:16

## 2024-10-04 RX ADMIN — FUROSEMIDE 20 MG: 20 TABLET ORAL at 08:53

## 2024-10-04 RX ADMIN — METOPROLOL SUCCINATE 50 MG: 50 TABLET, EXTENDED RELEASE ORAL at 08:54

## 2024-10-04 RX ADMIN — SULFAMETHOXAZOLE AND TRIMETHOPRIM 1 TABLET: 800; 160 TABLET ORAL at 11:45

## 2024-10-04 RX ADMIN — ISOSORBIDE MONONITRATE 30 MG: 30 TABLET, EXTENDED RELEASE ORAL at 08:53

## 2024-10-04 RX ADMIN — ATORVASTATIN CALCIUM 40 MG: 40 TABLET, FILM COATED ORAL at 08:53

## 2024-10-04 NOTE — PROGRESS NOTES
Hourly rounding completed during shift. CBI going at a slow rate.  All needs met at this time. Bed L/L with call bell in reach.  Report given to oncoming RN.

## 2024-10-04 NOTE — PROGRESS NOTES
Admit Date: 10/3/2024      Subjective:     Manjit Arroyo is POD 1 Procedure(s):  CYSTOSCOPY MONOPOLAR TRANSURETHRAL RESECTION PROSTATE- Medtronic Pacer Depend L Chest    No new complaints.    Objective:     Patient Vitals for the past 8 hrs:   BP Temp Temp src Pulse Resp SpO2   10/04/24 0853 102/65 -- -- 75 -- --   10/04/24 0712 102/65 97.3 °F (36.3 °C) Oral 75 16 99 %     No intake/output data recorded.  No intake/output data recorded.    Physical Exam:  GENERAL ASSESSMENT: alert, oriented to person, place and time, no acute distress and no anxiety, depression or agitation  Chest: normal work of breathing  CVS exam: normal rate, regular rhythm, normal S1, S2, no murmurs, rubs, clicks or gallops.  ABDOMEN: not done  Neurological exam reveals alert, oriented, normal speech, no focal findings or movement disorder noted.  FEMALE GENITOURINARY EXAM: not done  MALE GENITAL EXAM: not done    Data Review No results found for this or any previous visit (from the past 24 hour(s)).    Assessment:     Principal Problem:    Enlarged prostate with lower urinary tract symptoms (LUTS)  Active Problems:    Status post cystoscopy  Resolved Problems:    * No resolved hospital problems. *    Stapletno with CBI slow drip. [place] UOP. HGB 10.6. WBC 4.9. VSS.    Pre-Op Diagnosis: Enlarged prostate with lower urinary tract symptoms (LUTS) [N40.1]    Post-Op Diagnosis:  * No post-op diagnosis entered *    Procedures: Procedure(s):  S/P CYSTOSCOPY MONOPOLAR TRANSURETHRAL RESECTION PROSTATE- Medtronic Pacer Depend L Chest      Plan:   Wean CBI as able.  Ambulate halls.  Voiding trial tomorrow.      Signed By: Meryl Lopez, NP-C     October 4, 2024      [facility]

## 2024-10-04 NOTE — CARE COORDINATION
Assessment completed with patient at bedside, son Gerardo present. Patient lives with his son, Gerardo. Patient has a walker and a cane at home. Patient reports being IND with all adls at baseline. Demographics and PCP confirmed. Discharge plan is to return home with son. No anticipated discharge needs.      10/04/24 7064   Service Assessment   Patient Orientation Alert and Oriented   Cognition Alert   History Provided By Patient   Primary Caregiver Self   Accompanied By/Relationship son-gerardo   Support Systems Children;Family Members   Patient's Healthcare Decision Maker is: Legal Next of Kin   PCP Verified by CM Yes   Last Visit to PCP Within last 3 months   Prior Functional Level Independent in ADLs/IADLs   Current Functional Level Independent in ADLs/IADLs   Can patient return to prior living arrangement Yes   Ability to make needs known: Good   Family able to assist with home care needs: Yes   Would you like for me to discuss the discharge plan with any other family members/significant others, and if so, who? Yes  (son)   Financial Resources Medicare   Community Resources None   Condition of Participation: Discharge Planning   The Plan for Transition of Care is related to the following treatment goals: return home with son   The Patient and/or Patient Representative was provided with a Choice of Provider? Patient   The Patient and/Or Patient Representative agree with the Discharge Plan? Yes   Freedom of Choice list was provided with basic dialogue that supports the patient's individualized plan of care/goals, treatment preferences, and shares the quality data associated with the providers?  Yes         Adam ARGUETA, ACM  [facility]

## 2024-10-05 LAB
BACTERIA SPEC CULT: ABNORMAL
BACTERIA SPEC CULT: ABNORMAL
SERVICE CMNT-IMP: ABNORMAL

## 2024-10-05 PROCEDURE — 99231 SBSQ HOSP IP/OBS SF/LOW 25: CPT | Performed by: NURSE PRACTITIONER

## 2024-10-05 PROCEDURE — 1100000000 HC RM PRIVATE

## 2024-10-05 PROCEDURE — 97161 PT EVAL LOW COMPLEX 20 MIN: CPT

## 2024-10-05 PROCEDURE — 97116 GAIT TRAINING THERAPY: CPT

## 2024-10-05 PROCEDURE — 6370000000 HC RX 637 (ALT 250 FOR IP): Performed by: NURSE PRACTITIONER

## 2024-10-05 PROCEDURE — 97165 OT EVAL LOW COMPLEX 30 MIN: CPT

## 2024-10-05 PROCEDURE — 6370000000 HC RX 637 (ALT 250 FOR IP): Performed by: UROLOGY

## 2024-10-05 PROCEDURE — 6360000002 HC RX W HCPCS: Performed by: NURSE PRACTITIONER

## 2024-10-05 PROCEDURE — 97535 SELF CARE MNGMENT TRAINING: CPT

## 2024-10-05 PROCEDURE — 97530 THERAPEUTIC ACTIVITIES: CPT

## 2024-10-05 PROCEDURE — 2580000003 HC RX 258: Performed by: NURSE PRACTITIONER

## 2024-10-05 RX ORDER — NITROFURANTOIN 25; 75 MG/1; MG/1
100 CAPSULE ORAL EVERY 12 HOURS SCHEDULED
Status: DISCONTINUED | OUTPATIENT
Start: 2024-10-05 | End: 2024-10-05

## 2024-10-05 RX ORDER — OXYCODONE HYDROCHLORIDE 5 MG/1
5 TABLET ORAL EVERY 4 HOURS PRN
Qty: 12 TABLET | Refills: 0 | Status: SHIPPED | OUTPATIENT
Start: 2024-10-05 | End: 2024-10-08

## 2024-10-05 RX ORDER — AMOXICILLIN 500 MG/1
500 CAPSULE ORAL 2 TIMES DAILY
Qty: 8 CAPSULE | Refills: 0 | Status: SHIPPED | OUTPATIENT
Start: 2024-10-05 | End: 2024-10-09

## 2024-10-05 RX ORDER — SULFAMETHOXAZOLE/TRIMETHOPRIM 800-160 MG
1 TABLET ORAL EVERY 12 HOURS SCHEDULED
Qty: 7 TABLET | Refills: 0 | Status: SHIPPED | OUTPATIENT
Start: 2024-10-05 | End: 2024-10-07 | Stop reason: HOSPADM

## 2024-10-05 RX ADMIN — ATORVASTATIN CALCIUM 40 MG: 40 TABLET, FILM COATED ORAL at 10:18

## 2024-10-05 RX ADMIN — PANTOPRAZOLE SODIUM 40 MG: 40 TABLET, DELAYED RELEASE ORAL at 05:24

## 2024-10-05 RX ADMIN — ISOSORBIDE MONONITRATE 30 MG: 30 TABLET, EXTENDED RELEASE ORAL at 10:17

## 2024-10-05 RX ADMIN — VANCOMYCIN HYDROCHLORIDE 2000 MG: 10 INJECTION, POWDER, LYOPHILIZED, FOR SOLUTION INTRAVENOUS at 17:34

## 2024-10-05 RX ADMIN — METOPROLOL SUCCINATE 50 MG: 50 TABLET, EXTENDED RELEASE ORAL at 10:17

## 2024-10-05 RX ADMIN — FUROSEMIDE 20 MG: 20 TABLET ORAL at 10:18

## 2024-10-05 RX ADMIN — GABAPENTIN 600 MG: 300 CAPSULE ORAL at 10:18

## 2024-10-05 RX ADMIN — AMOXICILLIN 500 MG: 500 CAPSULE ORAL at 10:16

## 2024-10-05 RX ADMIN — SULFAMETHOXAZOLE AND TRIMETHOPRIM 1 TABLET: 800; 160 TABLET ORAL at 10:17

## 2024-10-05 RX ADMIN — WATER 2000 MG: 1 INJECTION INTRAMUSCULAR; INTRAVENOUS; SUBCUTANEOUS at 16:45

## 2024-10-05 NOTE — PROGRESS NOTES
PT was in chair. CH introduced self. PT shared about health and engaged in life review.  PT is originally from [place] [place] [place]. PT expressed great affection for Family including PT's Spouse who predeceased PT. PT and Spouse had three Sons one of whom predeceased PT. PT and Spouse enjoyed traveling in their motor home after [facility]. CH offered empathetic spiritual presence, active listening, and therapeutic communication. PT expressed comfort in Yanelis and Prayer. PT is Hindu. CH offered prayer. CH thanked PT for visit and offered support.     Rev. Emma Tee M.Div.

## 2024-10-05 NOTE — PROGRESS NOTES
Hourly rounding completed during shift. Pt urinating without any difficulties.  All needs met at this time. Bed L/L with call bell in reach, bed alarm on.  Report given to oncoming RN.

## 2024-10-05 NOTE — PROGRESS NOTES
Stapleton removed per order/policy.  Pt tolerated well.  Instructed pt to urinate into urinal for accurate measurements, pt verbalized understanding, urinal left within reach of pt.

## 2024-10-05 NOTE — PROGRESS NOTES
VANCO DAILY FOLLOW UP RENAL INSUFFICIENCY PATIENT   [facility]   Pharmacy Pharmacokinetic Monitoring Service - Vancomycin    Consulting Provider:  Meryl Lopez NP  Indication: UTI  Target Concentration: Random level <= 20 mg/L  Day of Therapy: 1 of 7  Additional Antimicrobials: cefepime    Pertinent Laboratory Values:   Wt Readings from Last 1 Encounters:   10/04/24 96.6 kg (213 lb)     Temp Readings from Last 1 Encounters:   10/05/24 97.9 °F (36.6 °C) (Oral)     No results for input(s): \"BUN\", \"CREATININE\", \"WBC\", \"PROCAL\", \"LACACIDPL\", \"LACTA\", \"LCAD\", \"LACTSEPSIS\" in the last 72 hours.    Invalid input(s): \"PROCAT\", \"PCT\", \"LAC\", \"LACT\", \"LACPOC\"    No results found for: \"VANCOTROUGH\", \"VANCORANDOM\"    MRSA Nasal Swab: N/A. Non-respiratory infection    Assessment:  Date:  Dose/Freq Admin Times Level/Time:   10/5 2000 mg x 1 (17)    10/6   Rd @ (04)                       Plan:  Concentration-guided dosing due to renal impairment  Start vancomycin 2000 mg x 1  Vancomycin concentration ordered for 10/6 @ 0400  Pharmacy will continue to monitor patient and adjust therapy as indicated    Thank you for the consult,  Maryann Infante [facility]

## 2024-10-05 NOTE — THERAPY EVALUATION
services to address stated deficits to promote return to highest level of function, independence, and safety. Will continue to follow. HHPT recommended at d/c     [facility] AM-PAC™ “6 Clicks” Basic Mobility Inpatient Short Form  AM-PAC Basic Mobility - Inpatient   How much help is needed turning from your back to your side while in a flat bed without using bedrails?: None  How much help is needed moving from lying on your back to sitting on the side of a flat bed without using bedrails?: None  How much help is needed moving to and from a bed to a chair?: A Little  How much help is needed standing up from a chair using your arms?: A Little  How much help is needed walking in hospital room?: A Little  How much help is needed climbing 3-5 steps with a railing?: A Little  [facility] : 20  AM-PAC Inpatient T-Scale Score : 47.67  Mobility Inpatient CMS 0-100% Score: 35.83  Mobility Inpatient CMS G-Code Modifier : CJ    SUBJECTIVE:   Mr. Arroyo states, \"This walker is heavy\"     Social/Functional Lives With: Son  Type of Home: House  Home Layout: One level  Home Access:  (1 step entry)  Home Equipment: Cane, Walker - Rolling, Rollator  Has the patient had two or more falls in the past year or any fall with injury in the past year?: Yes  Receives Help From: Family  ADL Assistance: Independent  Ambulation Assistance: Independent  Transfer Assistance: Independent  Additional Comments: son works Mon-Wed otherwise is home with pt.    OBJECTIVE:     PAIN: VITALS / O2: PRECAUTION / LINES / DRAINS:   Pre Treatment: 0/10         Post Treatment: 0/10 Vitals        Oxygen  RA    Stapleton Catheter and IV    RESTRICTIONS/PRECAUTIONS:  Restrictions/Precautions: Fall Risk                 GROSS EVALUATION: B LE Intact Impaired (Comments):   AROM [x]     PROM []    Strength [] Not formally tested, however estimated WFL    Balance [] Sitting - Static: Good  Sitting - Dynamic: Fair, +  Standing - Static: 
[] [] [] [] [] Rolling walker, household distances   I=Independent, Mod I=Modified Independent, S=Supervision/Setup, SBA=Standby Assistance, CGA=Contact Guard Assistance, Min=Minimal Assistance, Mod=Moderate Assistance, Max=Maximal Assistance, Total=Total Assistance, NT=Not Tested    PLAN:   FREQUENCY/DURATION   OT Plan of Care: 3 times/week for duration of hospital stay or until stated goals are met, whichever comes first.    PROBLEM LIST:   (Skilled intervention is medically necessary to address:)  Decreased ADL/Functional Activities  Decreased Activity Tolerance  Decreased Balance  Decreased Gait Ability  Decreased Strength  Decreased Transfer Abilities   INTERVENTIONS PLANNED:  (Benefits and precautions of occupational therapy have been discussed with the patient.)  Self Care Training  Therapeutic Activity  Therapeutic Exercise/HEP  Neuromuscular Re-education  Manual Therapy  Education         TREATMENT:     EVALUATION: LOW COMPLEXITY: (Untimed Charge)  The initial evaluation charge encompasses clinical chart review, objective assessment, interpretation of assessment, and skilled monitoring of the patient's response to treatment in order to develop a plan of care.     TREATMENT:   Co-Treatment PT/OT necessary due to patient's decreased overall endurance/tolerance levels, as well as need for high level skilled assistance to complete functional transfers/mobility and functional tasks  Self Care (23 minutes): Patient participated in toileting and grooming ADLs in unsupported sitting and standing with minimal verbal cueing to increase independence, decrease assistance required, and increase activity tolerance. Patient also participated in bed mobility, functional mobility, functional transfer, and assistive device training to increase independence, decrease assistance required, increase activity tolerance, and increase safety awareness. The patient was educated on role of occupational therapy and transfer training

## 2024-10-05 NOTE — PROGRESS NOTES
Admit Date: 10/3/2024      Subjective:     Manjit Arroyo is POD  1 Procedure(s):  CYSTOSCOPY MONOPOLAR TRANSURETHRAL RESECTION PROSTATE- Medtronic Pacer Depend L Chest    No new complaints.    Objective:     Patient Vitals for the past 8 hrs:   BP Temp Temp src Pulse Resp SpO2   10/05/24 0736 106/60 97.9 °F (36.6 °C) Oral 78 16 92 %   10/05/24 0356 108/63 98.1 °F (36.7 °C) Oral 79 14 96 %     No intake/output data recorded.  10/03 1901 - 10/05 0700  In: 240 [P.O.:240]  Out: 2700 [Urine:2700]    Physical Exam:  GENERAL ASSESSMENT: alert, oriented to person, place and time, no acute distress and no anxiety, depression or agitation  Chest: normal work of breathing  CVS exam: normal rate, regular rhythm, normal S1, S2, no murmurs, rubs, clicks or gallops.  ABDOMEN: not done  Neurological exam reveals alert, oriented, normal speech, no focal findings or movement disorder noted.  FEMALE GENITOURINARY EXAM: not done  MALE GENITAL EXAM: not done    Data Review No results found for this or any previous visit (from the past 24 hour(s)).    Assessment:     Principal Problem:    Enlarged prostate with lower urinary tract symptoms (LUTS)  Active Problems:    Status post cystoscopy  Resolved Problems:    * No resolved hospital problems. *    Jones with CBI slow drip. [place] UOP. VSS.    Pre-Op Diagnosis: Enlarged prostate with lower urinary tract symptoms (LUTS) [N40.1]    Post-Op Diagnosis:  * No post-op diagnosis entered *    Procedures: Procedure(s):  CYSTOSCOPY MONOPOLAR TRANSURETHRAL RESECTION PROSTATE- Medtronic Pacer Depend L Chest      Plan:   Remove jones this am.  Will d/c home after voiding trial.      Signed By: Meryl Lopez NP-C     October 5, 2024      [facility]

## 2024-10-06 LAB
ANION GAP SERPL CALC-SCNC: 10 MMOL/L (ref 9–18)
BASOPHILS # BLD: 0 K/UL (ref 0–0.2)
BASOPHILS NFR BLD: 1 % (ref 0–2)
BUN SERPL-MCNC: 37 MG/DL (ref 8–23)
CALCIUM SERPL-MCNC: 8.1 MG/DL (ref 8.8–10.2)
CHLORIDE SERPL-SCNC: 102 MMOL/L (ref 98–107)
CO2 SERPL-SCNC: 22 MMOL/L (ref 20–28)
CREAT SERPL-MCNC: 2.89 MG/DL (ref 0.8–1.3)
CREAT SERPL-MCNC: 2.94 MG/DL (ref 0.8–1.3)
DIFFERENTIAL METHOD BLD: ABNORMAL
EOSINOPHIL # BLD: 0.2 K/UL (ref 0–0.8)
EOSINOPHIL NFR BLD: 3 % (ref 0.5–7.8)
ERYTHROCYTE [DISTWIDTH] IN BLOOD BY AUTOMATED COUNT: 15.1 % (ref 11.9–14.6)
GLUCOSE SERPL-MCNC: 138 MG/DL (ref 70–99)
HCT VFR BLD AUTO: 25.1 % (ref 41.1–50.3)
HGB BLD-MCNC: 8 G/DL (ref 13.6–17.2)
IMM GRANULOCYTES # BLD AUTO: 0 K/UL (ref 0–0.5)
IMM GRANULOCYTES NFR BLD AUTO: 0 % (ref 0–5)
LYMPHOCYTES # BLD: 0.9 K/UL (ref 0.5–4.6)
LYMPHOCYTES NFR BLD: 20 % (ref 13–44)
MCH RBC QN AUTO: 28.3 PG (ref 26.1–32.9)
MCHC RBC AUTO-ENTMCNC: 31.9 G/DL (ref 31.4–35)
MCV RBC AUTO: 88.7 FL (ref 82–102)
MONOCYTES # BLD: 0.5 K/UL (ref 0.1–1.3)
MONOCYTES NFR BLD: 12 % (ref 4–12)
NEUTS SEG # BLD: 2.8 K/UL (ref 1.7–8.2)
NEUTS SEG NFR BLD: 63 % (ref 43–78)
NRBC # BLD: 0 K/UL (ref 0–0.2)
PLATELET # BLD AUTO: 136 K/UL (ref 150–450)
PMV BLD AUTO: 13.3 FL (ref 9.4–12.3)
POTASSIUM SERPL-SCNC: 3.8 MMOL/L (ref 3.5–5.1)
RBC # BLD AUTO: 2.83 M/UL (ref 4.23–5.6)
SODIUM SERPL-SCNC: 134 MMOL/L (ref 136–145)
VANCOMYCIN SERPL-MCNC: 13.1 UG/ML
WBC # BLD AUTO: 4.4 K/UL (ref 4.3–11.1)

## 2024-10-06 PROCEDURE — 36410 VNPNXR 3YR/> PHY/QHP DX/THER: CPT

## 2024-10-06 PROCEDURE — 80048 BASIC METABOLIC PNL TOTAL CA: CPT

## 2024-10-06 PROCEDURE — 99231 SBSQ HOSP IP/OBS SF/LOW 25: CPT | Performed by: NURSE PRACTITIONER

## 2024-10-06 PROCEDURE — 6370000000 HC RX 637 (ALT 250 FOR IP): Performed by: UROLOGY

## 2024-10-06 PROCEDURE — 1100000000 HC RM PRIVATE

## 2024-10-06 PROCEDURE — 80202 ASSAY OF VANCOMYCIN: CPT

## 2024-10-06 PROCEDURE — 2580000003 HC RX 258: Performed by: INTERNAL MEDICINE

## 2024-10-06 PROCEDURE — 6370000000 HC RX 637 (ALT 250 FOR IP): Performed by: INTERNAL MEDICINE

## 2024-10-06 PROCEDURE — 6360000002 HC RX W HCPCS: Performed by: INTERNAL MEDICINE

## 2024-10-06 PROCEDURE — C1751 CATH, INF, PER/CENT/MIDLINE: HCPCS

## 2024-10-06 PROCEDURE — 36415 COLL VENOUS BLD VENIPUNCTURE: CPT

## 2024-10-06 PROCEDURE — 85025 COMPLETE CBC W/AUTO DIFF WBC: CPT

## 2024-10-06 RX ORDER — AMOXICILLIN 500 MG/1
500 CAPSULE ORAL EVERY 24 HOURS
Status: DISCONTINUED | OUTPATIENT
Start: 2024-10-06 | End: 2024-10-07 | Stop reason: HOSPADM

## 2024-10-06 RX ADMIN — METOPROLOL SUCCINATE 50 MG: 50 TABLET, EXTENDED RELEASE ORAL at 08:25

## 2024-10-06 RX ADMIN — ISOSORBIDE MONONITRATE 30 MG: 30 TABLET, EXTENDED RELEASE ORAL at 08:25

## 2024-10-06 RX ADMIN — ATORVASTATIN CALCIUM 40 MG: 40 TABLET, FILM COATED ORAL at 08:25

## 2024-10-06 RX ADMIN — FUROSEMIDE 20 MG: 20 TABLET ORAL at 08:25

## 2024-10-06 RX ADMIN — CEFEPIME 1000 MG: 1 INJECTION, POWDER, FOR SOLUTION INTRAMUSCULAR; INTRAVENOUS at 11:13

## 2024-10-06 RX ADMIN — ASPIRIN 81 MG 81 MG: 81 TABLET ORAL at 08:25

## 2024-10-06 RX ADMIN — GABAPENTIN 600 MG: 300 CAPSULE ORAL at 08:25

## 2024-10-06 RX ADMIN — PANTOPRAZOLE SODIUM 40 MG: 40 TABLET, DELAYED RELEASE ORAL at 05:13

## 2024-10-06 RX ADMIN — AMOXICILLIN 500 MG: 500 CAPSULE ORAL at 11:12

## 2024-10-06 NOTE — PROGRESS NOTES
MIDLINE Placement Note    PRE-PROCEDURE VERIFICATION  PROCEDURE DETAIL  Time out completed with Tessa Delgado RN and everyone in agreement with procedure.  A single lumen Midline was started for long term IV antibiotics. The following documentation is in addition to the Midline properties in the lines/airways flowsheet :  Lot #: [id number]  Xylocaine used: yes  Mid-Arm Circumference: 29 (cm)  Internal Catheter Length: 10 (cm)  Internal Catheter Total Length: 10 (cm)  Vein Selection for Midline: left  basilic                                                                  Line is okay to use: yes

## 2024-10-06 NOTE — PROGRESS NOTES
Admit Date: 10/3/2024      Subjective:     Manjit Arroyo is POD 3 Procedure(s):  CYSTOSCOPY MONOPOLAR TRANSURETHRAL RESECTION PROSTATE- Medtronic Pacer Depend L Chest    No new complains. Eating breakfast.    Objective:     Patient Vitals for the past 8 hrs:   BP Temp Temp src Pulse Resp SpO2   10/06/24 0758 120/72 97.9 °F (36.6 °C) Oral 80 16 95 %   10/06/24 0242 118/72 98.1 °F (36.7 °C) Oral 74 18 96 %     10/06 0701 - 10/06 1900  In: 240 [P.O.:240]  Out: 375 [Urine:375]  10/04 1901 - 10/06 0700  In: 680 [P.O.:680]  Out: 4425 [Urine:4425]    Physical Exam:  GENERAL ASSESSMENT: alert, oriented to person, place and time, no acute distress and no anxiety, depression or agitation  Chest: normal work of breathing  CVS exam: normal rate, regular rhythm, normal S1, S2, no murmurs, rubs, clicks or gallops.  ABDOMEN: not done  Neurological exam reveals alert, oriented, normal speech, no focal findings or movement disorder noted.  FEMALE GENITOURINARY EXAM: not done  MALE GENITAL EXAM: not done    Data Review   Recent Results (from the past 24 hour(s))   Creatinine    Collection Time: 10/06/24  4:34 AM   Result Value Ref Range    Creatinine 2.89 (H) 0.80 - 1.30 MG/DL   Vancomycin Level, Random    Collection Time: 10/06/24  4:34 AM   Result Value Ref Range    Vancomycin Rm 13.1 UG/ML       Assessment:     Principal Problem:    Enlarged prostate with lower urinary tract symptoms (LUTS)  Active Problems:    Status post cystoscopy  Resolved Problems:    * No resolved hospital problems. *    Voiding spontaneously. Awaiting am labs. VSS.    Pre-Op Diagnosis: Enlarged prostate with lower urinary tract symptoms (LUTS) [N40.1]    Post-Op Diagnosis:  * No post-op diagnosis entered *    Procedures: Procedure(s):  CYSTOSCOPY MONOPOLAR TRANSURETHRAL RESECTION PROSTATE- Medtronic Pacer Depend L Chest      Plan:   Will order PICC today.  ID consulted appreciate recs.  Continue cefepine and vanco for now.      Signed By: Meryl GRAVES

## 2024-10-06 NOTE — PROGRESS NOTES
Pt voiding well with urinal. Urine is becoming more yellow, although still a bit emma/red with small clots at times. Pt encouraged to stay hydrated. Pt verbalizes understanding and drank a couple of pitchers of water during the shift. Pt also had a BM today. Pt ambulating to bathroom with assistance and RW. Midline placed per vascular. Son updated regarding pt DC tomorrow. New PIV placed on L FA. Pt sat in the chair for a couple hours during shift.     Rounds performed throughout shift. Pt denies needs at this time. Bed in low position, locked and call light/personal items within reach.

## 2024-10-06 NOTE — CONSULTS
Infectious Disease Consult    Today's Date: 10/6/2024   Admit Date: 10/3/2024    Patient YOB: 1931    Impression:   UTI in the setting of TURP  Pseudomonas, E. Faecalis UTI  S/p TURP  BPH with urinary retention requiring Stapleton  CKD stage 4    Plan:   Given the TURP procedure, I would recommend a course of therapy to hopefully prevent invasive infection.  Would treat with IV cefepime 1gm q24hrs with last dose on 10/11/24 and oral amoxicillin 500mg once daily for the same duration.  This can be done via PIV or PICC at home.  PICC is probably not a great option due to his renal function.  Would recommend line removal at EOT.  No ID f/up needed    Anti-infectives:   IV cefepime  PO amoxicillin    Subjective:   Date of Consultation:  October 6, 2024  Referring Physician: Rocky Wood Jr., MD for: assistance in management of UTI    Patient is a 89 y.o. male with BPH and chronic Stapleton who presented for TURP. He had a pre-op urine culture which is positive for E. Faecalis and Pseudomonas (cipro - R).  He has been asymptomatic.  ID is consulted for antibiotic recommendations.  He reports doing well.  He asks good questions about how antibiotics can be done at home.    Patient Active Problem List   Diagnosis    CAD (coronary artery disease)    Mitral valve insufficiency    Esophageal reflux    Glaucoma    HLD (hyperlipidemia)    Chronic kidney disease, stage I    BPH (benign prostatic hyperplasia)    Pure hypercholesterolemia    Elevated prostate specific antigen (PSA)    Impotence of organic origin    Status post total right knee replacement    Anemia    Acute prostatitis    Family history of malignant neoplasm of prostate    Neuropathy    Hypertension    CKD (chronic kidney disease) stage 4, GFR 15-29 ml/min (HCC)    Congestive heart failure, unspecified HF chronicity, unspecified heart failure type (HCC)    Angina pectoris, unspecified    Atherosclerotic heart disease of native coronary artery with

## 2024-10-07 VITALS
RESPIRATION RATE: 18 BRPM | SYSTOLIC BLOOD PRESSURE: 118 MMHG | WEIGHT: 213 LBS | HEIGHT: 70 IN | BODY MASS INDEX: 30.49 KG/M2 | DIASTOLIC BLOOD PRESSURE: 74 MMHG | HEART RATE: 77 BPM | OXYGEN SATURATION: 94 % | TEMPERATURE: 97.7 F

## 2024-10-07 PROBLEM — N13.8 BPH WITH URINARY OBSTRUCTION: Status: ACTIVE | Noted: 2024-09-04

## 2024-10-07 PROCEDURE — 6370000000 HC RX 637 (ALT 250 FOR IP): Performed by: UROLOGY

## 2024-10-07 PROCEDURE — 6370000000 HC RX 637 (ALT 250 FOR IP): Performed by: INTERNAL MEDICINE

## 2024-10-07 PROCEDURE — 2580000003 HC RX 258: Performed by: INTERNAL MEDICINE

## 2024-10-07 PROCEDURE — 2580000003 HC RX 258: Performed by: UROLOGY

## 2024-10-07 PROCEDURE — 99231 SBSQ HOSP IP/OBS SF/LOW 25: CPT | Performed by: NURSE PRACTITIONER

## 2024-10-07 PROCEDURE — 6360000002 HC RX W HCPCS: Performed by: INTERNAL MEDICINE

## 2024-10-07 RX ORDER — SODIUM CHLORIDE 9 MG/ML
INJECTION, SOLUTION INTRAVENOUS PRN
Status: DISCONTINUED | OUTPATIENT
Start: 2024-10-07 | End: 2024-10-07 | Stop reason: HOSPADM

## 2024-10-07 RX ADMIN — GABAPENTIN 600 MG: 300 CAPSULE ORAL at 09:00

## 2024-10-07 RX ADMIN — METOPROLOL SUCCINATE 50 MG: 50 TABLET, EXTENDED RELEASE ORAL at 09:00

## 2024-10-07 RX ADMIN — CEFEPIME 1000 MG: 1 INJECTION, POWDER, FOR SOLUTION INTRAMUSCULAR; INTRAVENOUS at 10:34

## 2024-10-07 RX ADMIN — PANTOPRAZOLE SODIUM 40 MG: 40 TABLET, DELAYED RELEASE ORAL at 05:38

## 2024-10-07 RX ADMIN — FUROSEMIDE 20 MG: 20 TABLET ORAL at 08:59

## 2024-10-07 RX ADMIN — ASPIRIN 81 MG 81 MG: 81 TABLET ORAL at 09:00

## 2024-10-07 RX ADMIN — AMOXICILLIN 500 MG: 500 CAPSULE ORAL at 09:00

## 2024-10-07 RX ADMIN — ISOSORBIDE MONONITRATE 30 MG: 30 TABLET, EXTENDED RELEASE ORAL at 08:59

## 2024-10-07 RX ADMIN — ATORVASTATIN CALCIUM 40 MG: 40 TABLET, FILM COATED ORAL at 09:03

## 2024-10-07 RX ADMIN — SODIUM CHLORIDE: 9 INJECTION, SOLUTION INTRAVENOUS at 10:33

## 2024-10-07 NOTE — PROGRESS NOTES
Hourly rounds complete this shift, no new complaints at this time, patient voiding in urinal bed in low, locked position, call light and bedside table within reach,  all needs met. Report to day shift nurse.

## 2024-10-07 NOTE — CARE COORDINATION
CHRISTOPHER verified with Nima at [facility] [place] that he has OPAT orders and pt can discharge home today after IVAB given. Nima came to pt room and gave pt teaching. CHRISTOPHER verified with [facility] that pt will be followed by them for home IVAB's, confirmed. Pt has discharge order for today. Pt lives with his son, Gerardo. No other needs identified.

## 2024-10-07 NOTE — PROGRESS NOTES
Admit Date: 10/3/2024      Subjective:     Manjit Arroyo is POD  Procedure(s):  CYSTOSCOPY MONOPOLAR TRANSURETHRAL RESECTION PROSTATE- [employer] Pacer Depend L Chest    No new complaints.    Objective:     Patient Vitals for the past 8 hrs:   BP Temp Temp src Pulse Resp SpO2   10/07/24 0749 118/74 97.7 °F (36.5 °C) Oral 77 -- 94 %   10/07/24 0338 105/67 97.9 °F (36.6 °C) Oral 79 18 95 %     No intake/output data recorded.  10/05 1901 - 10/07 0700  In: 1350 [P.O.:800]  Out: 4650 [Urine:4650]    Physical Exam:  GENERAL ASSESSMENT: alert, oriented to person, place and time, no acute distress and no anxiety, depression or agitation  Chest: normal work of breathing  CVS exam: normal rate, regular rhythm, normal S1, S2, no murmurs, rubs, clicks or gallops.  ABDOMEN: not done  Neurological exam reveals alert, oriented, normal speech, no focal findings or movement disorder noted.  FEMALE GENITOURINARY EXAM: not done  MALE GENITAL EXAM: not done    Data Review   Recent Results (from the past 24 hour(s))   CBC with Auto Differential    Collection Time: 10/06/24  9:34 AM   Result Value Ref Range    WBC 4.4 4.3 - 11.1 K/uL    RBC 2.83 (L) 4.23 - 5.6 M/uL    Hemoglobin 8.0 (L) 13.6 - 17.2 g/dL    Hematocrit 25.1 (L) 41.1 - 50.3 %    MCV 88.7 82 - 102 FL    MCH 28.3 26.1 - 32.9 PG    MCHC 31.9 31.4 - 35.0 g/dL    RDW 15.1 (H) 11.9 - 14.6 %    Platelets 136 (L) 150 - 450 K/uL    MPV 13.3 (H) 9.4 - 12.3 FL    nRBC 0.00 0.0 - 0.2 K/uL    Differential Type AUTOMATED      Neutrophils % 63 43 - 78 %    Lymphocytes % 20 13 - 44 %    Monocytes % 12 4.0 - 12.0 %    Eosinophils % 3 0.5 - 7.8 %    Basophils % 1 0.0 - 2.0 %    Immature Granulocytes % 0 0.0 - 5.0 %    Neutrophils Absolute 2.8 1.7 - 8.2 K/UL    Lymphocytes Absolute 0.9 0.5 - 4.6 K/UL    Monocytes Absolute 0.5 0.1 - 1.3 K/UL    Eosinophils Absolute 0.2 0.0 - 0.8 K/UL    Basophils Absolute 0.0 0.0 - 0.2 K/UL    Immature Granulocytes Absolute 0.0 0.0 - 0.5 K/UL   Basic

## 2024-10-07 NOTE — DISCHARGE SUMMARY
discharged with a catheter with follow-up with his urologist Dr. Wood. Patient did undergo cystoscopy with clot evacuation on 6/15/2024. This was done by Dr. Carlisle. His hematuria was subsequently clear. He also required treatment for right lower lobe pneumonia with acute hypoxic and respiratory insufficiency. He was treated with a course of antibiotics initially ceftriaxone and then doxycycline and his pneumonia would resolve. Also during acute hospitalization patient underwent colonoscopy on 6/19/2024 and had mucosal ulceration at the anus and rectum. 6 mm polyp was removed from the ascending colon and 14 mm polyp removed from the cecum.  At the rehabilitation hospital patient did well. He made good progress in his therapies and improved medically. Patient will transition to home health physical therapy and Occupational Therapy. Patient has referral to follow-up with his urologist in 1 week.      He is followed here by Dr. Wood.  I actually saw him back in March and at that time he was given tamsulosin to take daily.  Patient is unclear as to whether or not he is taking medication.  He actually does not remember being here in March and says that he thought it has been 5 years since he saw Dr. Wood.     History of ELEVATED PSA, ABNORMAL PROSTATE, favorable % free PSA.  Positive family history of prostate cancer in one brother.  He has history of BPH and elevated PSA. Previous negative biopsy. . Also chronic renal failure.   PSA 5.5 in 12-06.4.89 with 35% free PSA in 1-07.Creatinine 1.8-2.0.   PSA 4.39 with 39% free pSA in 1-08.   PSA 5.16 in 2009.   Referred back because of PSA of 6.7 in July 2012. PSA up to 8.3 in October 2012.   . PSA in January 2011 was 5.3 with 47.2% free component. He had acute prostatitis with Proteus in March 2011. Had a negative renal ultrasound after that episode of acute prostatitis.   PSA was 8.21 %free 33 (11/22/12.) Has good stream.   PSA 6.82 in January 2014. PSA 9.6 in 11-14.  
Final  RDW                                           Date: 10/06/2024  Value: 15.1 (H)    Ref range: 11.9 - 14.6 %      Status: Final  Platelets                                     Date: 10/06/2024  Value: 136 (L)     Ref range: 150 - 450 K/uL     Status: Final  MPV                                           Date: 10/06/2024  Value: 13.3 (H)    Ref range: 9.4 - 12.3 FL      Status: Final  nRBC                                          Date: 10/06/2024  Value: 0.00        Ref range: 0.0 - 0.2 K/uL     Status: Final                Comment: **Note: Absolute NRBC parameter is now reported with Hemogram**  Differential Type                             Date: 10/06/2024  Value: AUTOMATED   Ref range:                    Status: Final  Neutrophils %                                 Date: 10/06/2024  Value: 63          Ref range: 43 - 78 %          Status: Final  Lymphocytes %                                 Date: 10/06/2024  Value: 20          Ref range: 13 - 44 %          Status: Final  Monocytes %                                   Date: 10/06/2024  Value: 12          Ref range: 4.0 - 12.0 %       Status: Final  Eosinophils %                                 Date: 10/06/2024  Value: 3           Ref range: 0.5 - 7.8 %        Status: Final  Basophils %                                   Date: 10/06/2024  Value: 1           Ref range: 0.0 - 2.0 %        Status: Final  Immature Granulocytes %                       Date: 10/06/2024  Value: 0           Ref range: 0.0 - 5.0 %        Status: Final  Neutrophils Absolute                          Date: 10/06/2024  Value: 2.8         Ref range: 1.7 - 8.2 K/UL     Status: Final  Lymphocytes Absolute                          Date: 10/06/2024  Value: 0.9         Ref range: 0.5 - 4.6 K/UL     Status: Final  Monocytes Absolute                            Date: 10/06/2024  Value: 0.5         Ref range: 0.1 - 1.3 K/UL     Status: Final  Eosinophils Absolute                          Date:

## 2024-10-09 RX ORDER — LIDOCAINE HYDROCHLORIDE 20 MG/ML
INJECTION, SOLUTION EPIDURAL; INFILTRATION; INTRACAUDAL; PERINEURAL
Status: DISCONTINUED | OUTPATIENT
Start: 2024-10-03 | End: 2024-10-09 | Stop reason: SDUPTHER

## 2024-10-09 RX ORDER — PROPOFOL 10 MG/ML
INJECTION, EMULSION INTRAVENOUS
Status: DISCONTINUED | OUTPATIENT
Start: 2024-10-03 | End: 2024-10-09 | Stop reason: SDUPTHER

## 2024-10-09 RX ORDER — EPHEDRINE SULFATE 5 MG/ML
INJECTION INTRAVENOUS
Status: DISCONTINUED | OUTPATIENT
Start: 2024-10-03 | End: 2024-10-09 | Stop reason: SDUPTHER

## 2024-10-09 RX ORDER — ONDANSETRON 2 MG/ML
INJECTION INTRAMUSCULAR; INTRAVENOUS
Status: DISCONTINUED | OUTPATIENT
Start: 2024-10-03 | End: 2024-10-09 | Stop reason: SDUPTHER

## 2024-10-09 RX ORDER — SODIUM CHLORIDE, SODIUM LACTATE, POTASSIUM CHLORIDE, CALCIUM CHLORIDE 600; 310; 30; 20 MG/100ML; MG/100ML; MG/100ML; MG/100ML
INJECTION, SOLUTION INTRAVENOUS
Status: DISCONTINUED | OUTPATIENT
Start: 2024-10-03 | End: 2024-10-09 | Stop reason: SDUPTHER

## 2024-10-09 RX ORDER — DEXAMETHASONE SODIUM PHOSPHATE 4 MG/ML
INJECTION, SOLUTION INTRA-ARTICULAR; INTRALESIONAL; INTRAMUSCULAR; INTRAVENOUS; SOFT TISSUE
Status: DISCONTINUED | OUTPATIENT
Start: 2024-10-03 | End: 2024-10-09 | Stop reason: SDUPTHER

## 2024-10-15 ENCOUNTER — TELEPHONE (OUTPATIENT)
Dept: UROLOGY | Age: 89
End: 2024-10-15

## 2024-10-15 NOTE — TELEPHONE ENCOUNTER
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      PSA 9.4 in 8-23.   Given his age, I recommend following this with PSA. Has appt with NP on 10-23-24.   Needs PSA in 3 months

## 2024-10-23 ENCOUNTER — OFFICE VISIT (OUTPATIENT)
Dept: UROLOGY | Age: 89
End: 2024-10-23
Payer: MEDICARE

## 2024-10-23 DIAGNOSIS — R33.8 BENIGN PROSTATIC HYPERPLASIA WITH URINARY RETENTION: Primary | ICD-10-CM

## 2024-10-23 DIAGNOSIS — N40.1 BENIGN PROSTATIC HYPERPLASIA WITH URINARY RETENTION: Primary | ICD-10-CM

## 2024-10-23 LAB
BILIRUBIN, URINE, POC: NEGATIVE
BLOOD URINE, POC: NORMAL
GLUCOSE URINE, POC: NEGATIVE MG/DL
KETONES, URINE, POC: NEGATIVE MG/DL
LEUKOCYTE ESTERASE, URINE, POC: NORMAL
NITRITE, URINE, POC: NEGATIVE
PH, URINE, POC: 6 (ref 4.6–8)
PROTEIN,URINE, POC: NEGATIVE MG/DL
PVR, POC: 93 CC
SPECIFIC GRAVITY, URINE, POC: 1.01 (ref 1–1.03)
URINALYSIS CLARITY, POC: NORMAL
URINALYSIS COLOR, POC: NORMAL
UROBILINOGEN, POC: NORMAL MG/DL

## 2024-10-23 PROCEDURE — 81003 URINALYSIS AUTO W/O SCOPE: CPT | Performed by: NURSE PRACTITIONER

## 2024-10-23 PROCEDURE — 99024 POSTOP FOLLOW-UP VISIT: CPT | Performed by: NURSE PRACTITIONER

## 2024-10-23 PROCEDURE — 51798 US URINE CAPACITY MEASURE: CPT | Performed by: NURSE PRACTITIONER

## 2024-10-23 ASSESSMENT — ENCOUNTER SYMPTOMS
NAUSEA: 0
BACK PAIN: 0

## 2024-10-23 NOTE — PROGRESS NOTES
[facility]  [address] [place] [id number]  [phone]          Manjit Arroyo  : 1931    Chief Complaint   Patient presents with    Follow-up    Urinary Retention          HPI     Manjit Arroyo is a 89 y.o. male  Returns today status post TURP.  Patient is doing better.  Urination is normal.  PVR reveals 93 mL.    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      History below( copied from previous note)  History as copied from [facility] notes:  89-year-old man admitted to [facility] on 2024 for rehabilitation efforts related to debility. The patient has prior history of coronary artery disease, moderate aortic stenosis, heart failure with reduced ejection fraction, hypertension, chronic kidney disease stage IV, and bradycardia status post pacemaker. He was admitted to [facility] on 2024 being transferred from [facility]. Patient was admitted with gross hematuria and required Stapleton placement for urinary retention. He is found to be hypotensive with prior medication regiment of spironolactone, Lasix, metoprolol. Patient's antihypertensive medications were held and he was stabilized. Urology placed the Stapleton catheter and recommended that he be discharged with a catheter with follow-up with his urologist Dr. Wood. Patient did undergo cystoscopy with clot evacuation on 6/15/2024. This was done by Dr. Carlisle. His hematuria was subsequently clear. He also required treatment for right lower lobe pneumonia with acute hypoxic and respiratory insufficiency. He was treated with a course of antibiotics initially ceftriaxone and then doxycycline and his pneumonia would resolve. Also during acute hospitalization patient underwent colonoscopy on 2024 and had mucosal ulceration at the anus and rectum. 6 mm polyp was removed from

## 2024-11-01 ENCOUNTER — TELEPHONE (OUTPATIENT)
Dept: FAMILY MEDICINE CLINIC | Facility: CLINIC | Age: 88
End: 2024-11-01

## 2024-11-01 NOTE — TELEPHONE ENCOUNTER
JENNIFER for patient to call back to reschedule his appointment on 11/14/24 with a different provider besides Dr. Meraz

## 2024-11-15 ENCOUNTER — HOSPITAL ENCOUNTER (EMERGENCY)
Age: 89
Discharge: HOME | End: 2024-11-15
Attending: GENERAL PRACTICE
Payer: MEDICARE

## 2024-11-15 VITALS
OXYGEN SATURATION: 96 % | DIASTOLIC BLOOD PRESSURE: 79 MMHG | WEIGHT: 212 LBS | HEIGHT: 69 IN | BODY MASS INDEX: 31.4 KG/M2 | TEMPERATURE: 97.7 F | RESPIRATION RATE: 18 BRPM | HEART RATE: 92 BPM | SYSTOLIC BLOOD PRESSURE: 133 MMHG

## 2024-11-15 DIAGNOSIS — J01.00 ACUTE NON-RECURRENT MAXILLARY SINUSITIS: Primary | ICD-10-CM

## 2024-11-15 DIAGNOSIS — J40 BRONCHITIS: ICD-10-CM

## 2024-11-15 LAB — STREP, MOLECULAR: NOT DETECTED

## 2024-11-15 PROCEDURE — 99283 EMERGENCY DEPT VISIT LOW MDM: CPT

## 2024-11-15 PROCEDURE — 87651 STREP A DNA AMP PROBE: CPT

## 2024-11-15 RX ORDER — AMOXICILLIN 500 MG/1
500 CAPSULE ORAL 2 TIMES DAILY
Qty: 14 CAPSULE | Refills: 0 | Status: SHIPPED | OUTPATIENT
Start: 2024-11-15 | End: 2024-11-22

## 2024-11-15 ASSESSMENT — PAIN SCALES - GENERAL: PAINLEVEL_OUTOF10: 3

## 2024-11-15 ASSESSMENT — PAIN - FUNCTIONAL ASSESSMENT: PAIN_FUNCTIONAL_ASSESSMENT: 0-10

## 2024-11-15 NOTE — ED PROVIDER NOTES
OPHTHALMIC SOLUTION    APPLY 1 DROP IN RIGHT EYE TWICE A DAY    FOLIC ACID (FOLVITE) 800 MCG TABLET    Take 1 tablet by mouth daily    FUROSEMIDE (LASIX) 20 MG TABLET    Take 1 tablet by mouth daily    GABAPENTIN (NEURONTIN) 600 MG TABLET    Take 1 tablet by mouth 2 times daily for 90 days.    HYDROCORTISONE 2.5 % CREAM    Apply topically 2 times daily.    IPRATROPIUM (ATROVENT) 0.06 % NASAL SPRAY    2 sprays by Each Nostril route 3 times daily as needed for Rhinitis    ISOSORBIDE MONONITRATE (IMDUR) 30 MG EXTENDED RELEASE TABLET    Take 1 tablet by mouth daily    LATANOPROST (XALATAN) 0.005 % OPHTHALMIC SOLUTION    Apply 1 drop to eye 2 times daily    METOPROLOL SUCCINATE (TOPROL XL) 50 MG EXTENDED RELEASE TABLET    Take 1 tablet by mouth daily    OMEPRAZOLE (PRILOSEC OTC) 20 MG TABLET    Take 1 tablet by mouth    OXYCODONE (ROXICODONE) 5 MG IMMEDIATE RELEASE TABLET    Take 1 tablet by mouth every 4 hours as needed for Pain for up to 3 days. Max Daily Amount: 30 mg    SPIRONOLACTONE (ALDACTONE) 25 MG TABLET    Take 1 tablet by mouth daily    TAMSULOSIN (FLOMAX) 0.4 MG CAPSULE    Take 1 capsule by mouth daily        Results for orders placed or performed during the hospital encounter of 11/15/24   Group A Strep Screen By PCR    Specimen: Swab   Result Value Ref Range    Strep, Molecular Not detected NOTD           No orders to display                No results for input(s): \"COVID19\" in the last 72 hours.    Voice dictation software was used during the making of this note.  This software is not perfect and grammatical and other typographical errors may be present.  This note has not been completely proofread for errors.        Neil Beltran DO  11/15/24 [id number]

## 2024-11-21 ENCOUNTER — OFFICE VISIT (OUTPATIENT)
Dept: FAMILY MEDICINE CLINIC | Facility: CLINIC | Age: 89
End: 2024-11-21
Payer: MEDICARE

## 2024-11-21 VITALS
BODY MASS INDEX: 30.51 KG/M2 | DIASTOLIC BLOOD PRESSURE: 70 MMHG | OXYGEN SATURATION: 98 % | WEIGHT: 206 LBS | SYSTOLIC BLOOD PRESSURE: 118 MMHG | TEMPERATURE: 97.3 F | HEART RATE: 75 BPM | HEIGHT: 69 IN

## 2024-11-21 DIAGNOSIS — I50.9 CONGESTIVE HEART FAILURE, UNSPECIFIED HF CHRONICITY, UNSPECIFIED HEART FAILURE TYPE (HCC): Primary | ICD-10-CM

## 2024-11-21 DIAGNOSIS — G62.9 NEUROPATHY: ICD-10-CM

## 2024-11-21 PROCEDURE — 99212 OFFICE O/P EST SF 10 MIN: CPT | Performed by: NURSE PRACTITIONER

## 2024-11-21 PROCEDURE — 1123F ACP DISCUSS/DSCN MKR DOCD: CPT | Performed by: NURSE PRACTITIONER

## 2024-11-21 PROCEDURE — 1160F RVW MEDS BY RX/DR IN RCRD: CPT | Performed by: NURSE PRACTITIONER

## 2024-11-21 PROCEDURE — 1159F MED LIST DOCD IN RCRD: CPT | Performed by: NURSE PRACTITIONER

## 2024-11-21 RX ORDER — LORAZEPAM 0.5 MG
1 TABLET ORAL DAILY
COMMUNITY

## 2024-11-21 RX ORDER — FUROSEMIDE 20 MG/1
20 TABLET ORAL DAILY
Qty: 90 TABLET | Refills: 0 | Status: SHIPPED | OUTPATIENT
Start: 2024-11-21

## 2024-11-21 RX ORDER — GABAPENTIN 600 MG/1
600 TABLET ORAL DAILY
Qty: 90 TABLET | Refills: 0 | Status: SHIPPED | OUTPATIENT
Start: 2024-11-21 | End: 2025-02-19

## 2024-11-21 ASSESSMENT — ENCOUNTER SYMPTOMS
CHEST TIGHTNESS: 0
ALLERGIC/IMMUNOLOGIC NEGATIVE: 1
COLOR CHANGE: 0
SHORTNESS OF BREATH: 0
GASTROINTESTINAL NEGATIVE: 1
WHEEZING: 0
COUGH: 0

## 2024-11-21 NOTE — PROGRESS NOTES
Temp 97.3 °F (36.3 °C) (Temporal)   Ht 1.753 m (5' 9.02\")   Wt 93.4 kg (206 lb)   SpO2 98%   BMI 30.41 kg/m²     Physical Exam  Vitals reviewed.   Constitutional:       General: He is not in acute distress.     Appearance: Normal appearance. He is not ill-appearing.   HENT:      Head: Normocephalic and atraumatic.   Eyes:      General:         Right eye: No discharge.         Left eye: No discharge.      Extraocular Movements: Extraocular movements intact.      Conjunctiva/sclera: Conjunctivae normal.      Pupils: Pupils are equal, round, and reactive to light.   Cardiovascular:      Rate and Rhythm: Normal rate and regular rhythm.      Heart sounds: Murmur heard.      Systolic murmur is present with a grade of 2/6.   Pulmonary:      Effort: Pulmonary effort is normal. No respiratory distress.      Breath sounds: Normal breath sounds. No wheezing.   Abdominal:      Palpations: Abdomen is soft.   Musculoskeletal:         General: Normal range of motion.      Cervical back: Normal range of motion and neck supple.      Right lower leg: No edema.      Left lower leg: No edema.   Skin:     General: Skin is warm and dry.      Findings: No erythema or rash.   Neurological:      General: No focal deficit present.      Mental Status: He is alert and oriented to person, place, and time.      Sensory: No sensory deficit.      Motor: Weakness present.      Comments: Moderate generalized numbness, using walker   Psychiatric:         Mood and Affect: Mood normal.         Behavior: Behavior normal.          Admission on 11/15/2024, Discharged on 11/15/2024   Component Date Value Ref Range Status    Strep, Molecular 11/15/2024 Not detected  NOTD   Final    Comment: Negative for Strep A nucleic acid  Methodology: Isothermal Nucleic Acid Amplification         Asessment and Plan  1. Congestive heart failure, unspecified HF chronicity, unspecified heart failure type (HCC)  -     furosemide (LASIX) 20 MG tablet; Take 1 tablet by

## 2024-12-11 ENCOUNTER — OFFICE VISIT (OUTPATIENT)
Dept: UROLOGY | Age: 88
End: 2024-12-11
Payer: MEDICARE

## 2024-12-11 DIAGNOSIS — C61 PROSTATE CANCER (HCC): ICD-10-CM

## 2024-12-11 DIAGNOSIS — R33.8 BENIGN PROSTATIC HYPERPLASIA WITH URINARY RETENTION: Primary | ICD-10-CM

## 2024-12-11 DIAGNOSIS — N40.1 BENIGN PROSTATIC HYPERPLASIA WITH URINARY RETENTION: Primary | ICD-10-CM

## 2024-12-11 LAB
BILIRUBIN, URINE, POC: NEGATIVE
BLOOD URINE, POC: NORMAL
GLUCOSE URINE, POC: NEGATIVE MG/DL
KETONES, URINE, POC: NEGATIVE MG/DL
LEUKOCYTE ESTERASE, URINE, POC: NORMAL
NITRITE, URINE, POC: NEGATIVE
PH, URINE, POC: 6 (ref 4.6–8)
PROTEIN,URINE, POC: NEGATIVE MG/DL
SPECIFIC GRAVITY, URINE, POC: 1.02 (ref 1–1.03)
URINALYSIS CLARITY, POC: NORMAL
URINALYSIS COLOR, POC: NORMAL
UROBILINOGEN, POC: NORMAL MG/DL

## 2024-12-11 PROCEDURE — 81003 URINALYSIS AUTO W/O SCOPE: CPT | Performed by: UROLOGY

## 2024-12-11 PROCEDURE — 99024 POSTOP FOLLOW-UP VISIT: CPT | Performed by: UROLOGY

## 2024-12-11 ASSESSMENT — ENCOUNTER SYMPTOMS: BACK PAIN: 0

## 2024-12-11 NOTE — PROGRESS NOTES
[facility]  [address] [place] [id number]  [phone]    Manjit Arroyo  : 1931    Chief Complaint   Patient presents with    Follow-up    Benign Prostatic Hypertrophy        HPI     Manjit Arroyo is a 89 y.o. male  Patient was seen just 2 weeks ago for urinary retention.  He experienced retention secondary to constipation.  He is accompanied by the son today.  The son tells me that they have been in the ER 4 or 5 times this month secondary to constipation.  He has yet to be referred to GI due to his constipation.  I will send a referral today.  He had his catheter replaced earlier this week due to inability to void.  He is on tamsulosin.  He has been using that daily.  He is followed here by Dr. Wood.  See his history as below.     History as copied from [facility] notes:  89-year-old man admitted to [facility] on 2024 for rehabilitation efforts related to debility. The patient has prior history of coronary artery disease, moderate aortic stenosis, heart failure with reduced ejection fraction, hypertension, chronic kidney disease stage IV, and bradycardia status post pacemaker. He was admitted to [facility] on 2024 being transferred from [facility]. Patient was admitted with gross hematuria and required Stapleton placement for urinary retention. He is found to be hypotensive with prior medication regiment of spironolactone, Lasix, metoprolol. Patient's antihypertensive medications were held and he was stabilized. Urology placed the Stapleton catheter and recommended that he be discharged with a catheter with follow-up with his urologist Dr. Wood. Patient did undergo cystoscopy with clot evacuation on 6/15/2024. This was done by Dr. Carlisle. His hematuria was subsequently clear. He also required treatment for right lower lobe pneumonia with acute hypoxic and respiratory insufficiency. He was treated with a course of antibiotics

## 2024-12-31 DIAGNOSIS — I50.9 CONGESTIVE HEART FAILURE, UNSPECIFIED HF CHRONICITY, UNSPECIFIED HEART FAILURE TYPE (HCC): ICD-10-CM

## 2024-12-31 RX ORDER — FUROSEMIDE 20 MG/1
20 TABLET ORAL DAILY
Qty: 90 TABLET | Refills: 0 | Status: SHIPPED | OUTPATIENT
Start: 2024-12-31

## 2024-12-31 RX ORDER — SPIRONOLACTONE 25 MG/1
25 TABLET ORAL DAILY
Qty: 90 TABLET | Refills: 0 | Status: SHIPPED | OUTPATIENT
Start: 2024-12-31

## 2024-12-31 NOTE — TELEPHONE ENCOUNTER
Pre-Hospital Care Report (PCR) Refill:  Furosemide  Dosage: 20 mg  Freq: qd  [facility]    Refill: Spironolactone  Dosage: 25 mg  Freq:   [facility]

## 2025-03-12 ENCOUNTER — OFFICE VISIT (OUTPATIENT)
Dept: UROLOGY | Age: 89
End: 2025-03-12
Payer: MEDICARE

## 2025-03-12 DIAGNOSIS — N40.1 BENIGN PROSTATIC HYPERPLASIA WITH URINARY RETENTION: Primary | ICD-10-CM

## 2025-03-12 DIAGNOSIS — C61 PROSTATE CANCER (HCC): ICD-10-CM

## 2025-03-12 DIAGNOSIS — R33.8 BENIGN PROSTATIC HYPERPLASIA WITH URINARY RETENTION: Primary | ICD-10-CM

## 2025-03-12 LAB
BILIRUBIN, URINE, POC: NEGATIVE
BLOOD URINE, POC: NEGATIVE
GLUCOSE URINE, POC: NEGATIVE MG/DL
KETONES, URINE, POC: NEGATIVE MG/DL
LEUKOCYTE ESTERASE, URINE, POC: NORMAL
NITRITE, URINE, POC: NEGATIVE
PH, URINE, POC: 6 (ref 4.6–8)
PROTEIN,URINE, POC: NEGATIVE MG/DL
PSA SERPL-MCNC: 4.5 NG/ML (ref 0–4)
SPECIFIC GRAVITY, URINE, POC: 1.01 (ref 1–1.03)
URINALYSIS CLARITY, POC: NORMAL
URINALYSIS COLOR, POC: NORMAL
UROBILINOGEN, POC: NORMAL MG/DL

## 2025-03-12 PROCEDURE — 1159F MED LIST DOCD IN RCRD: CPT | Performed by: UROLOGY

## 2025-03-12 PROCEDURE — 81003 URINALYSIS AUTO W/O SCOPE: CPT | Performed by: UROLOGY

## 2025-03-12 PROCEDURE — G8427 DOCREV CUR MEDS BY ELIG CLIN: HCPCS | Performed by: UROLOGY

## 2025-03-12 PROCEDURE — G8417 CALC BMI ABV UP PARAM F/U: HCPCS | Performed by: UROLOGY

## 2025-03-12 PROCEDURE — 1123F ACP DISCUSS/DSCN MKR DOCD: CPT | Performed by: UROLOGY

## 2025-03-12 PROCEDURE — 99213 OFFICE O/P EST LOW 20 MIN: CPT | Performed by: UROLOGY

## 2025-03-12 PROCEDURE — 1036F TOBACCO NON-USER: CPT | Performed by: UROLOGY

## 2025-03-12 ASSESSMENT — ENCOUNTER SYMPTOMS
NAUSEA: 0
COUGH: 0

## 2025-03-12 NOTE — PROGRESS NOTES
daily for 90 days. 90 tablet 0    oxyCODONE (ROXICODONE) 5 MG immediate release tablet Take 1 tablet by mouth every 4 hours as needed for Pain for up to 3 days. Max Daily Amount: 30 mg 12 tablet 0     No current facility-administered medications for this visit.     Allergies   Allergen Reactions    Latex Rash     Other reaction(s): Unknown    Adhesive Tape Dermatitis and Rash     Other reaction(s): Rash-Allergy  Tape, BLISTERS       Social History     Socioeconomic History    Marital status:      Spouse name: Not on file    Number of children: Not on file    Years of education: Not on file    Highest education level: Not on file   Occupational History    Not on file   Tobacco Use    Smoking status: Former     Current packs/day: 0.00     Average packs/day: 0.5 packs/day for 26.0 years (13.0 ttl pk-yrs)     Types: Cigarettes, Pipe     Start date:      Quit date:      Years since quittin.2    Smokeless tobacco: Never    Tobacco comments:     Quit smoking: stopped cigarettes & pipe in    Vaping Use    Vaping status: Never Used   Substance and Sexual Activity    Alcohol use: Not Currently    Drug use: No    Sexual activity: Defer   Other Topics Concern    Not on file   Social History Narrative    Not on file     Social Drivers of Health     Financial Resource Strain: Low Risk  (2024)    Overall Financial Resource Strain (CARDIA)     Difficulty of Paying Living Expenses: Not hard at all   Food Insecurity: No Food Insecurity (10/4/2024)    Hunger Vital Sign     Worried About Running Out of Food in the Last Year: Never true     Ran Out of Food in the Last Year: Never true   Transportation Needs: No Transportation Needs (10/4/2024)    PRAPARE - Transportation     Lack of Transportation (Medical): No     Lack of Transportation (Non-Medical): No   Physical Activity: Inactive (2024)    Exercise Vital Sign     Days of Exercise per Week: 0 days     Minutes of Exercise per Session: 0 min   Stress:

## 2025-03-17 ENCOUNTER — RESULTS FOLLOW-UP (OUTPATIENT)
Dept: UROLOGY | Age: 89
End: 2025-03-17

## 2025-03-19 DIAGNOSIS — C61 PROSTATE CANCER (HCC): Primary | ICD-10-CM

## 2025-03-21 ENCOUNTER — OFFICE VISIT (OUTPATIENT)
Dept: FAMILY MEDICINE CLINIC | Facility: CLINIC | Age: 89
End: 2025-03-21
Payer: MEDICARE

## 2025-03-21 VITALS
HEIGHT: 69 IN | SYSTOLIC BLOOD PRESSURE: 120 MMHG | TEMPERATURE: 98.5 F | OXYGEN SATURATION: 97 % | WEIGHT: 206 LBS | DIASTOLIC BLOOD PRESSURE: 72 MMHG | HEART RATE: 86 BPM | BODY MASS INDEX: 30.51 KG/M2

## 2025-03-21 DIAGNOSIS — L21.9 SEBORRHEIC DERMATITIS: ICD-10-CM

## 2025-03-21 DIAGNOSIS — G60.9 IDIOPATHIC PERIPHERAL NEUROPATHY: ICD-10-CM

## 2025-03-21 DIAGNOSIS — I10 PRIMARY HYPERTENSION: Primary | ICD-10-CM

## 2025-03-21 DIAGNOSIS — I50.9 CONGESTIVE HEART FAILURE, UNSPECIFIED HF CHRONICITY, UNSPECIFIED HEART FAILURE TYPE (HCC): ICD-10-CM

## 2025-03-21 PROBLEM — T78.2XXA ANAPHYLAXIS: Status: RESOLVED | Noted: 2017-10-09 | Resolved: 2025-03-21

## 2025-03-21 PROBLEM — I71.40 AAA (ABDOMINAL AORTIC ANEURYSM): Status: ACTIVE | Noted: 2019-02-04

## 2025-03-21 PROBLEM — R22.0 TONGUE SWELLING: Status: RESOLVED | Noted: 2017-10-09 | Resolved: 2025-03-21

## 2025-03-21 PROBLEM — R31.0 GROSS HEMATURIA: Status: RESOLVED | Noted: 2024-06-15 | Resolved: 2025-03-21

## 2025-03-21 PROBLEM — I49.5 SSS (SICK SINUS SYNDROME) (HCC): Status: RESOLVED | Noted: 2020-01-03 | Resolved: 2025-03-21

## 2025-03-21 PROBLEM — Z86.0100 HISTORY OF COLONIC POLYPS: Status: RESOLVED | Noted: 2025-03-21 | Resolved: 2025-03-21

## 2025-03-21 PROBLEM — T82.897A PACING-INDUCED CARDIOMYOPATHY (HCC): Status: RESOLVED | Noted: 2023-10-12 | Resolved: 2025-03-21

## 2025-03-21 PROBLEM — I42.9 PACING-INDUCED CARDIOMYOPATHY (HCC): Status: RESOLVED | Noted: 2023-10-12 | Resolved: 2025-03-21

## 2025-03-21 PROBLEM — R00.1 BRADYCARDIA: Status: RESOLVED | Noted: 2020-01-02 | Resolved: 2025-03-21

## 2025-03-21 PROBLEM — J18.9 PNEUMONIA: Status: RESOLVED | Noted: 2020-09-24 | Resolved: 2025-03-21

## 2025-03-21 PROBLEM — R06.02 SHORTNESS OF BREATH: Status: RESOLVED | Noted: 2020-09-19 | Resolved: 2025-03-21

## 2025-03-21 PROBLEM — N17.9 AKI (ACUTE KIDNEY INJURY): Status: RESOLVED | Noted: 2022-10-24 | Resolved: 2025-03-21

## 2025-03-21 PROBLEM — N13.8 BPH WITH URINARY OBSTRUCTION: Status: RESOLVED | Noted: 2024-09-04 | Resolved: 2025-03-21

## 2025-03-21 PROBLEM — R53.81 DEBILITY: Status: ACTIVE | Noted: 2024-06-21

## 2025-03-21 PROBLEM — I35.0 NONRHEUMATIC AORTIC VALVE STENOSIS: Status: RESOLVED | Noted: 2023-10-03 | Resolved: 2025-03-21

## 2025-03-21 PROBLEM — R33.9 RETENTION OF URINE: Status: RESOLVED | Noted: 2024-06-15 | Resolved: 2025-03-21

## 2025-03-21 PROBLEM — N40.1 BPH WITH URINARY OBSTRUCTION: Status: RESOLVED | Noted: 2024-09-04 | Resolved: 2025-03-21

## 2025-03-21 PROBLEM — D62 ACUTE BLOOD LOSS ANEMIA: Status: RESOLVED | Noted: 2024-06-21 | Resolved: 2025-03-21

## 2025-03-21 PROBLEM — R93.3 ABNORMAL CT SCAN, COLON: Status: RESOLVED | Noted: 2024-06-14 | Resolved: 2025-03-21

## 2025-03-21 PROBLEM — E21.3 HYPERPARATHYROIDISM: Status: RESOLVED | Noted: 2025-03-21 | Resolved: 2025-03-21

## 2025-03-21 PROBLEM — R00.0 TACHYCARDIA: Status: RESOLVED | Noted: 2020-09-22 | Resolved: 2025-03-21

## 2025-03-21 PROBLEM — Z95.0 PACEMAKER: Status: ACTIVE | Noted: 2020-01-15

## 2025-03-21 PROCEDURE — 1159F MED LIST DOCD IN RCRD: CPT | Performed by: FAMILY MEDICINE

## 2025-03-21 PROCEDURE — 1123F ACP DISCUSS/DSCN MKR DOCD: CPT | Performed by: FAMILY MEDICINE

## 2025-03-21 PROCEDURE — 99214 OFFICE O/P EST MOD 30 MIN: CPT | Performed by: FAMILY MEDICINE

## 2025-03-21 RX ORDER — HYDROCORTISONE 25 MG/G
CREAM TOPICAL
Qty: 28 G | Refills: 1 | Status: SHIPPED | OUTPATIENT
Start: 2025-03-21

## 2025-03-21 RX ORDER — SPIRONOLACTONE 25 MG/1
25 TABLET ORAL DAILY
Qty: 90 TABLET | Refills: 1 | Status: SHIPPED | OUTPATIENT
Start: 2025-03-21 | End: 2025-09-17

## 2025-03-21 RX ORDER — TRIAMCINOLONE ACETONIDE 1 MG/ML
LOTION TOPICAL 3 TIMES DAILY
Qty: 60 ML | Refills: 1 | Status: SHIPPED | OUTPATIENT
Start: 2025-03-21 | End: 2025-05-20

## 2025-03-21 RX ORDER — TRIAMCINOLONE ACETONIDE 1 MG/ML
LOTION TOPICAL 3 TIMES DAILY
COMMUNITY
End: 2025-03-21 | Stop reason: SDUPTHER

## 2025-03-21 RX ORDER — FUROSEMIDE 20 MG/1
20 TABLET ORAL DAILY
Qty: 90 TABLET | Refills: 1 | Status: SHIPPED | OUTPATIENT
Start: 2025-03-21 | End: 2025-09-17

## 2025-03-21 ASSESSMENT — ENCOUNTER SYMPTOMS
DIARRHEA: 0
SORE THROAT: 0
NAUSEA: 0
VOMITING: 0
ABDOMINAL PAIN: 0
CHEST TIGHTNESS: 1
CONSTIPATION: 0
SHORTNESS OF BREATH: 1

## 2025-03-21 ASSESSMENT — PATIENT HEALTH QUESTIONNAIRE - PHQ9
SUM OF ALL RESPONSES TO PHQ QUESTIONS 1-9: 0
SUM OF ALL RESPONSES TO PHQ QUESTIONS 1-9: 0
1. LITTLE INTEREST OR PLEASURE IN DOING THINGS: NOT AT ALL
2. FEELING DOWN, DEPRESSED OR HOPELESS: NOT AT ALL
SUM OF ALL RESPONSES TO PHQ QUESTIONS 1-9: 0
SUM OF ALL RESPONSES TO PHQ QUESTIONS 1-9: 0

## 2025-03-21 NOTE — ASSESSMENT & PLAN NOTE
Chronic, not at goal (unstable), discussed taking gabapentin at night due to the patient waking up with some neuropathic pain.  Patient stated that he has been taking acetaminophen with some relief of his symptoms and will continue taking gabapentin in the morning.

## 2025-03-21 NOTE — ASSESSMENT & PLAN NOTE
Chronic, at goal (stable), continue current treatment plan and medication adherence emphasized    Orders:    furosemide (LASIX) 20 MG tablet; Take 1 tablet by mouth daily    spironolactone (ALDACTONE) 25 MG tablet; Take 1 tablet by mouth daily

## 2025-03-21 NOTE — PROGRESS NOTES
Manjit Arroyo (:  1931) is a 89 y.o. male,Established patient, here for evaluation of the following chief complaint(s):  Established New Doctor (LEG PAIN , CHEST TIGHTNESS )         Assessment & Plan  Primary hypertension   Chronic, at goal (stable), continue current treatment plan and medication adherence emphasized         Congestive heart failure, unspecified HF chronicity, unspecified heart failure type (HCC)   Chronic, at goal (stable), continue current treatment plan and medication adherence emphasized    Orders:    furosemide (LASIX) 20 MG tablet; Take 1 tablet by mouth daily    spironolactone (ALDACTONE) 25 MG tablet; Take 1 tablet by mouth daily    Idiopathic peripheral neuropathy   Chronic, not at goal (unstable), discussed taking gabapentin at night due to the patient waking up with some neuropathic pain.  Patient stated that he has been taking acetaminophen with some relief of his symptoms and will continue taking gabapentin in the morning.         Seborrheic dermatitis   Chronic, at goal (stable), continue current treatment plan    Orders:    hydrocortisone 2.5 % cream; Apply topically 2 times daily.    triamcinolone (KENALOG) 0.1 % lotion; Apply topically 3 times daily Apply topically 3 times daily.      No follow-ups on file.       Subjective   HPI    Essential hypertension/congestive heart failure-patient is currently taking furosemide 20 mg, 1 p.o. daily in the morning, spironolactone 25 mg, 1 p.o. daily, isosorbide mononitrate 30 mg extended release tablet, 1 p.o. daily, and metoprolol succinate 50 mg to release tablet, 1 p.o. daily for history of essential hypertension and congestive heart failure.  Patient had an estimated ejection fraction of 40 to 45% on his most recent echo obtained in 2024.  Patient states he is due to see cardiology and will have his echo repeated.  Patient does complain of some occasional tightness in his chest and shortness of breath, but states that

## 2025-06-04 ENCOUNTER — LAB (OUTPATIENT)
Dept: UROLOGY | Age: 89
End: 2025-06-04

## 2025-06-04 DIAGNOSIS — C61 PROSTATE CANCER (HCC): ICD-10-CM

## 2025-06-04 LAB — PSA SERPL-MCNC: 4.5 NG/ML (ref 0–4)

## 2025-06-11 ENCOUNTER — OFFICE VISIT (OUTPATIENT)
Dept: UROLOGY | Age: 89
End: 2025-06-11
Payer: MEDICARE

## 2025-06-11 DIAGNOSIS — N40.1 BENIGN PROSTATIC HYPERPLASIA WITH URINARY RETENTION: ICD-10-CM

## 2025-06-11 DIAGNOSIS — R33.8 BENIGN PROSTATIC HYPERPLASIA WITH URINARY RETENTION: ICD-10-CM

## 2025-06-11 DIAGNOSIS — C61 PROSTATE CANCER (HCC): Primary | ICD-10-CM

## 2025-06-11 LAB
BILIRUBIN, URINE, POC: NEGATIVE
BLOOD URINE, POC: NEGATIVE
GLUCOSE URINE, POC: NEGATIVE MG/DL
KETONES, URINE, POC: NEGATIVE MG/DL
LEUKOCYTE ESTERASE, URINE, POC: NORMAL
NITRITE, URINE, POC: NEGATIVE
PH, URINE, POC: 6 (ref 4.6–8)
PROTEIN,URINE, POC: NEGATIVE MG/DL
SPECIFIC GRAVITY, URINE, POC: 1.01 (ref 1–1.03)
URINALYSIS CLARITY, POC: NORMAL
URINALYSIS COLOR, POC: NORMAL
UROBILINOGEN, POC: NORMAL MG/DL

## 2025-06-11 PROCEDURE — 1159F MED LIST DOCD IN RCRD: CPT | Performed by: UROLOGY

## 2025-06-11 PROCEDURE — G8427 DOCREV CUR MEDS BY ELIG CLIN: HCPCS | Performed by: UROLOGY

## 2025-06-11 PROCEDURE — 81003 URINALYSIS AUTO W/O SCOPE: CPT | Performed by: UROLOGY

## 2025-06-11 PROCEDURE — 99213 OFFICE O/P EST LOW 20 MIN: CPT | Performed by: UROLOGY

## 2025-06-11 PROCEDURE — G8417 CALC BMI ABV UP PARAM F/U: HCPCS | Performed by: UROLOGY

## 2025-06-11 PROCEDURE — 1036F TOBACCO NON-USER: CPT | Performed by: UROLOGY

## 2025-06-11 PROCEDURE — 1123F ACP DISCUSS/DSCN MKR DOCD: CPT | Performed by: UROLOGY

## 2025-06-11 ASSESSMENT — ENCOUNTER SYMPTOMS
NAUSEA: 0
BACK PAIN: 0

## 2025-06-11 NOTE — PROGRESS NOTES
POC 6.0 4.6 - 8.0    Protein, Urine, POC Negative Negative mg/dL    Urobilinogen, POC 0.2 mg/dL <1.1 mg/dL    Nitrite, Urine, POC Negative Negative    Leukocyte Esterase, Urine, POC Trace Negative   Results for orders placed or performed in visit on 06/10/21   AMB POC URINALYSIS DIP STICK AUTO W/O MICRO    Collection Time: 06/10/21  9:00 AM   Result Value Ref Range    Color (UA POC) Yellow     Clarity (UA POC) Clear     Glucose, Urine, POC Negative Negative    Bilirubin, Urine, POC Negative Negative    Ketones, Urine, POC Negative Negative    Specific Gravity, Urine, POC 1.015 1.001 - 1.035 NA    Blood (UA POC) Negative Negative    pH, Urine, POC 6.0 4.6 - 8.0 NA    Protein, Urine, POC Negative Negative    Urobilinogen, POC normal 0.2 - 1    Nitrite, Urine, POC Negative Negative    Leukocyte Esterase, Urine, POC Negative Negative       UA - Micro  WBC - 0  RBC - 0  Bacteria - 0  Epith - 0    Physical Exam    Assessment and Plan  Manjit was seen today for 3 month follow-up.    Diagnoses and all orders for this visit:    Prostate cancer (HCC)  -     AMB POC URINALYSIS DIP STICK AUTO W/O MICRO  -     PSA, Diagnostic; Future    Benign prostatic hyperplasia with urinary retention  -     AMB POC URINALYSIS DIP STICK AUTO W/O MICRO       Stable PSA.    Follow-up and Dispositions    Return in about 1 year (around 6/11/2026) for appt, PSA before.

## 2025-07-13 ENCOUNTER — APPOINTMENT (OUTPATIENT)
Dept: GENERAL RADIOLOGY | Age: 89
End: 2025-07-13
Payer: MEDICARE

## 2025-07-13 ENCOUNTER — HOSPITAL ENCOUNTER (EMERGENCY)
Age: 89
Discharge: HOME | End: 2025-07-13
Payer: MEDICARE

## 2025-07-13 VITALS
HEIGHT: 69 IN | HEART RATE: 77 BPM | WEIGHT: 209 LBS | DIASTOLIC BLOOD PRESSURE: 84 MMHG | TEMPERATURE: 97.9 F | SYSTOLIC BLOOD PRESSURE: 124 MMHG | RESPIRATION RATE: 18 BRPM | BODY MASS INDEX: 30.96 KG/M2 | OXYGEN SATURATION: 98 %

## 2025-07-13 DIAGNOSIS — K59.00 CONSTIPATION, UNSPECIFIED CONSTIPATION TYPE: Primary | ICD-10-CM

## 2025-07-13 PROCEDURE — 74019 RADEX ABDOMEN 2 VIEWS: CPT

## 2025-07-13 PROCEDURE — 99283 EMERGENCY DEPT VISIT LOW MDM: CPT

## 2025-07-13 ASSESSMENT — PAIN SCALES - GENERAL
PAINLEVEL_OUTOF10: 2
PAINLEVEL_OUTOF10: 0

## 2025-07-13 ASSESSMENT — PAIN - FUNCTIONAL ASSESSMENT
PAIN_FUNCTIONAL_ASSESSMENT: 0-10
PAIN_FUNCTIONAL_ASSESSMENT: 0-10

## 2025-07-13 ASSESSMENT — LIFESTYLE VARIABLES
HOW OFTEN DO YOU HAVE A DRINK CONTAINING ALCOHOL: NEVER
HOW MANY STANDARD DRINKS CONTAINING ALCOHOL DO YOU HAVE ON A TYPICAL DAY: PATIENT DOES NOT DRINK

## 2025-07-13 NOTE — DISCHARGE INSTRUCTIONS
Continue MiraLAX daily along with drinking lots of water.  See your primary care for recheck return for any worsening symptoms

## 2025-07-13 NOTE — ED TRIAGE NOTES
Pt presents to ED with c/o general discomfort in abdomen and constipation. Last BM 4-5 days ago. No relief with mirilax.

## 2025-07-13 NOTE — ED PROVIDER NOTES
Emergency Department Provider Note       PCP: Ciro Vázquez MD   Age: 89 y.o.   Sex: male     DISPOSITION Decision To Discharge 07/13/2025 03:25:42 PM   DISPOSITION CONDITION Stable            ICD-10-CM    1. Constipation, unspecified constipation type  K59.00           Medical Decision Making     89-year-old male to ER complaint of constipation no bowel movement in about 4 to 5 days he has been using MiraLAX with minimal relief.  He is had no history of small bowel obstructions had no nausea or vomiting he is passing his urine fine.  X-rays done and positive for constipation.  Patient was given milk of molasses enema in the ER and had a large bowel movement.  Patient was advised to continue his MiraLAX and drink plenty of water and see his primary care for recheck return for any worsening symptoms     1 or more acute illnesses that pose a threat to life or bodily function.   Shared medical decision making was utilized in creating the patients health plan today.    I independently ordered and reviewed each unique test.  I reviewed external records: provider visit note from PCP.     I interpreted the X-rays flat and upright abdomen positive for constipation.              History     89 male to ER complaining of constipation.  He states he has not had a bowel movement in the last 4 days.  He has been using daily MiraLAX with no relief.  He states \"it feels like I need to go\" but it cannot come out.  His stomach has been \"rumbling\" he has been eating and drinking fine passing his urine fine he denies any abdominal surgery.  No back pain no chest pain or shortness of breath          ROS     Review of Systems   All other systems reviewed and are negative.       Physical Exam     Vitals signs and nursing note reviewed:  Vitals:    07/13/25 1400 07/13/25 1415 07/13/25 1430 07/13/25 1533   BP: 130/79 108/69 123/86 124/84   Pulse: 76 76 76 77   Resp: 18 17 16 18   Temp:       TempSrc:       SpO2: 98% 97% 98% 98%

## 2025-08-28 DIAGNOSIS — I10 ESSENTIAL (PRIMARY) HYPERTENSION: ICD-10-CM

## 2025-08-28 DIAGNOSIS — R97.20 ELEVATED PSA: ICD-10-CM

## 2025-08-28 DIAGNOSIS — Z12.5 SCREENING PSA (PROSTATE SPECIFIC ANTIGEN): ICD-10-CM

## 2025-08-28 DIAGNOSIS — E78.2 MIXED HYPERLIPIDEMIA: ICD-10-CM

## 2025-08-28 DIAGNOSIS — Z00.00 MEDICARE ANNUAL WELLNESS VISIT, SUBSEQUENT: ICD-10-CM

## 2025-08-28 DIAGNOSIS — Z13.89 SCREENING FOR BLOOD OR PROTEIN IN URINE: ICD-10-CM

## 2025-08-28 DIAGNOSIS — N18.4 CKD (CHRONIC KIDNEY DISEASE) STAGE 4, GFR 15-29 ML/MIN (HCC): Primary | ICD-10-CM

## 2025-09-01 ENCOUNTER — HOSPITAL ENCOUNTER (EMERGENCY)
Age: 89
Discharge: HOME | End: 2025-09-01
Attending: EMERGENCY MEDICINE
Payer: MEDICARE

## 2025-09-01 ENCOUNTER — APPOINTMENT (OUTPATIENT)
Dept: GENERAL RADIOLOGY | Age: 89
End: 2025-09-01
Payer: MEDICARE

## 2025-09-01 VITALS
DIASTOLIC BLOOD PRESSURE: 49 MMHG | TEMPERATURE: 97.7 F | BODY MASS INDEX: 31.9 KG/M2 | OXYGEN SATURATION: 96 % | WEIGHT: 215.4 LBS | RESPIRATION RATE: 19 BRPM | SYSTOLIC BLOOD PRESSURE: 114 MMHG | HEIGHT: 69 IN | HEART RATE: 79 BPM

## 2025-09-01 DIAGNOSIS — K59.00 CONSTIPATION, UNSPECIFIED CONSTIPATION TYPE: Primary | ICD-10-CM

## 2025-09-01 PROCEDURE — 74019 RADEX ABDOMEN 2 VIEWS: CPT

## 2025-09-01 PROCEDURE — 99283 EMERGENCY DEPT VISIT LOW MDM: CPT

## 2025-09-01 ASSESSMENT — ENCOUNTER SYMPTOMS
STRIDOR: 0
TROUBLE SWALLOWING: 0
CONSTIPATION: 1
SHORTNESS OF BREATH: 0
CHEST TIGHTNESS: 0
PHOTOPHOBIA: 0
COLOR CHANGE: 0
VOICE CHANGE: 0
EYE REDNESS: 0

## 2025-09-01 ASSESSMENT — PAIN SCALES - GENERAL: PAINLEVEL_OUTOF10: 3

## 2025-09-01 ASSESSMENT — PAIN DESCRIPTION - LOCATION: LOCATION: ABDOMEN

## 2025-09-01 ASSESSMENT — PAIN - FUNCTIONAL ASSESSMENT: PAIN_FUNCTIONAL_ASSESSMENT: 0-10

## 2025-09-05 ENCOUNTER — OFFICE VISIT (OUTPATIENT)
Dept: FAMILY MEDICINE CLINIC | Facility: CLINIC | Age: 89
End: 2025-09-05

## 2025-09-05 VITALS
HEIGHT: 69 IN | WEIGHT: 206 LBS | TEMPERATURE: 98.1 F | HEART RATE: 79 BPM | SYSTOLIC BLOOD PRESSURE: 132 MMHG | DIASTOLIC BLOOD PRESSURE: 68 MMHG | OXYGEN SATURATION: 97 % | BODY MASS INDEX: 30.51 KG/M2

## 2025-09-05 DIAGNOSIS — Z00.00 MEDICARE ANNUAL WELLNESS VISIT, SUBSEQUENT: ICD-10-CM

## 2025-09-05 DIAGNOSIS — N18.4 CKD (CHRONIC KIDNEY DISEASE) STAGE 4, GFR 15-29 ML/MIN (HCC): Primary | ICD-10-CM

## 2025-09-05 ASSESSMENT — PATIENT HEALTH QUESTIONNAIRE - PHQ9
2. FEELING DOWN, DEPRESSED OR HOPELESS: NOT AT ALL
SUM OF ALL RESPONSES TO PHQ QUESTIONS 1-9: 0
1. LITTLE INTEREST OR PLEASURE IN DOING THINGS: NOT AT ALL
SUM OF ALL RESPONSES TO PHQ QUESTIONS 1-9: 0

## 2025-09-05 ASSESSMENT — LIFESTYLE VARIABLES
HOW MANY STANDARD DRINKS CONTAINING ALCOHOL DO YOU HAVE ON A TYPICAL DAY: PATIENT DOES NOT DRINK
HOW OFTEN DO YOU HAVE A DRINK CONTAINING ALCOHOL: NEVER

## (undated) DEVICE — SOLUTION IRRIG 1000ML STRL H2O USP PLAS POUR BTL

## (undated) DEVICE — BAG DRAIN UROLOGY GENESIS NS

## (undated) DEVICE — CUTTING LOOP, 27FR. ANGLED, STERILE: Brand: N.A.

## (undated) DEVICE — SYRINGE MED 30ML STD CLR PLAS LUERLOCK TIP N CTRL DISP

## (undated) DEVICE — DEVICE STBL AD TRICOT ANCHR PD FOR 3 W F CATH STATLOK

## (undated) DEVICE — GOWN,REINFORCED,POLY,AURORA,XXLARGE,STR: Brand: MEDLINE

## (undated) DEVICE — CATHETER URETH 24FR BLLN 30CC STD LTX 3 W TWO OPP DRNGE EYE

## (undated) DEVICE — BAG,DRAINAGE,4L,A/R TOWER,LL,SLIDE TAP: Brand: MEDLINE

## (undated) DEVICE — SYRINGE,TOOMEY,IRRIGATION,70CC,STERILE: Brand: MEDLINE

## (undated) DEVICE — TURP TURB: Brand: MEDLINE INDUSTRIES, INC.

## (undated) DEVICE — GLOVE SURG SZ 8 CRM LTX FREE POLYISOPRENE POLYMER BEAD ANTI

## (undated) DEVICE — SOLUTION IRRIG 3000ML 1.5% GL USP UROMATIC CONT